# Patient Record
Sex: FEMALE | Race: WHITE | URBAN - METROPOLITAN AREA
[De-identification: names, ages, dates, MRNs, and addresses within clinical notes are randomized per-mention and may not be internally consistent; named-entity substitution may affect disease eponyms.]

---

## 2017-04-18 ENCOUNTER — OFFICE VISIT - HEALTHEAST (OUTPATIENT)
Dept: FAMILY MEDICINE | Facility: CLINIC | Age: 42
End: 2017-04-18

## 2017-04-18 DIAGNOSIS — R00.2 HEART PALPITATIONS: ICD-10-CM

## 2017-04-18 LAB
ATRIAL RATE - MUSE: 72 BPM
DIASTOLIC BLOOD PRESSURE - MUSE: NORMAL MMHG
INTERPRETATION ECG - MUSE: NORMAL
P AXIS - MUSE: 48 DEGREES
PR INTERVAL - MUSE: 152 MS
QRS DURATION - MUSE: 76 MS
QT - MUSE: 364 MS
QTC - MUSE: 398 MS
R AXIS - MUSE: 53 DEGREES
SYSTOLIC BLOOD PRESSURE - MUSE: NORMAL MMHG
T AXIS - MUSE: 38 DEGREES
VENTRICULAR RATE- MUSE: 72 BPM

## 2017-04-18 ASSESSMENT — MIFFLIN-ST. JEOR: SCORE: 1429.38

## 2017-04-20 ENCOUNTER — COMMUNICATION - HEALTHEAST (OUTPATIENT)
Dept: FAMILY MEDICINE | Facility: CLINIC | Age: 42
End: 2017-04-20

## 2017-04-20 DIAGNOSIS — D64.9 ANEMIA: ICD-10-CM

## 2017-06-15 ENCOUNTER — OFFICE VISIT - HEALTHEAST (OUTPATIENT)
Dept: FAMILY MEDICINE | Facility: CLINIC | Age: 42
End: 2017-06-15

## 2017-06-15 DIAGNOSIS — L30.9 DERMATITIS: ICD-10-CM

## 2017-06-15 ASSESSMENT — MIFFLIN-ST. JEOR: SCORE: 1436.47

## 2017-10-23 ENCOUNTER — AMBULATORY - HEALTHEAST (OUTPATIENT)
Dept: NURSING | Facility: CLINIC | Age: 42
End: 2017-10-23

## 2017-10-23 DIAGNOSIS — Z23 FLU VACCINE NEED: ICD-10-CM

## 2017-11-22 ENCOUNTER — HOSPITAL ENCOUNTER (OUTPATIENT)
Dept: MAMMOGRAPHY | Facility: HOSPITAL | Age: 42
Discharge: HOME | End: 2017-11-22
Attending: OBSTETRICS & GYNECOLOGY

## 2017-11-22 DIAGNOSIS — Z12.31 VISIT FOR SCREENING MAMMOGRAM: ICD-10-CM

## 2017-12-02 ENCOUNTER — HOSPITAL ENCOUNTER (EMERGENCY)
Facility: CLINIC | Age: 42
Discharge: HOME | End: 2017-12-02
Attending: NURSE PRACTITIONER | Admitting: NURSE PRACTITIONER
Payer: COMMERCIAL

## 2017-12-02 ENCOUNTER — RECORDS - HEALTHEAST (OUTPATIENT)
Dept: ADMINISTRATIVE | Facility: OTHER | Age: 42
End: 2017-12-02

## 2017-12-02 VITALS
BODY MASS INDEX: 24.25 KG/M2 | WEIGHT: 160 LBS | SYSTOLIC BLOOD PRESSURE: 115 MMHG | OXYGEN SATURATION: 100 % | DIASTOLIC BLOOD PRESSURE: 79 MMHG | TEMPERATURE: 98.3 F | RESPIRATION RATE: 16 BRPM | HEART RATE: 81 BPM | HEIGHT: 68 IN

## 2017-12-02 DIAGNOSIS — J02.9 ACUTE PHARYNGITIS, UNSPECIFIED ETIOLOGY: ICD-10-CM

## 2017-12-02 LAB
INTERNAL QC OK POCT: YES
S PYO AG THROAT QL IA.RAPID: NEGATIVE

## 2017-12-02 PROCEDURE — 99213 OFFICE O/P EST LOW 20 MIN: CPT

## 2017-12-02 PROCEDURE — 87880 STREP A ASSAY W/OPTIC: CPT | Performed by: NURSE PRACTITIONER

## 2017-12-02 PROCEDURE — 99213 OFFICE O/P EST LOW 20 MIN: CPT | Performed by: NURSE PRACTITIONER

## 2017-12-02 RX ORDER — CEPHALEXIN 500 MG/1
500 CAPSULE ORAL 2 TIMES DAILY
Qty: 20 CAPSULE | Refills: 0 | Status: SHIPPED | OUTPATIENT
Start: 2017-12-02 | End: 2017-12-12

## 2017-12-02 NOTE — ED AVS SNAPSHOT
[facility] Emergency Department    [address] [address]    Phone:  [phone]    Fax:  [phone]                                       Arcadio Barber   MRN: [id number]    Department:  [facility] Emergency Department   Date of Visit:  12/2/2017           Patient Information     Date Of Birth          1975        Your diagnoses for this visit were:     Acute pharyngitis, unspecified etiology- Presumed Strep Pharyngitis exposed to 2 of her children who have strep.       You were seen by Kitty Lugo APRN CNP.      Follow-up Information     Follow up with Clinic, [facility].    Why:  As needed    Contact information:    [address] [id number] [phone]        Discharge References/Attachments     PHARYNGITIS, STREP (PRESUMED) (ENGLISH)      24 Hour Appointment Hotline       To make an appointment at any [facility], call [phone] ([phone]). If you don't have a family doctor or clinic, we will help you find one. [facility] are conveniently located to serve the needs of you and your family.             Review of your medicines      START taking        Dose / Directions Last dose taken    cephALEXin 500 MG capsule   Commonly known as:  KEFLEX   Dose:  500 mg   Quantity:  20 capsule        Take 1 capsule (500 mg) by mouth 2 times daily for 10 days   Refills:  0                Prescriptions were sent or printed at these locations (1 Prescription)                   [facility] [address]   [address] [address]    Telephone:  [phone]   Fax:  [phone]   Hours:                  E-Prescribed (1 of 1)         cephALEXin (KEFLEX) 500 MG capsule                Procedures and tests performed during your visit     Rapid strep group A screen POCT      Orders Needing Specimen Collection     None      Pending Results     No orders found from 11/30/2017 to 12/3/2017.           "  Pending Culture Results     No orders found from 2017 to 12/3/2017.            Pending Results Instructions     If you had any lab results that were not finalized at the time of your Discharge, you can call the ED Lab Result RN at [phone]. You will be contacted by this team for any positive Lab results or changes in treatment. The nurses are available 7 days a week from 10A to 6:30P.  You can leave a message 24 hours per day and they will return your call.        Test Results From Your Hospital Stay        2017  8:04 PM      Component Results     Component Value Ref Range & Units Status    Rapid Strep A Screen Negative neg Final    Internal QC OK Yes  Final                Thank you for choosing [facility]       Thank you for choosing [facility] for your care. Our goal is always to provide you with excellent care. Hearing back from our patients is one way we can continue to improve our services. Please take a few minutes to complete the written survey that you may receive in the mail after you visit with us. Thank you!        [employer] Information     [employer] lets you send messages to your doctor, view your test results, renew your prescriptions, schedule appointments and more. To sign up, go to [email or web address] . Click on \"Log in\" on the left side of the screen, which will take you to the Welcome page. Then click on \"Sign up Now\" on the right side of the page.     You will be asked to enter the access code listed below, as well as some personal information. Please follow the directions to create your username and password.     Your access code is: [email or web address]  Expires: 3/2/2018  8:06 PM     Your access code will  in 90 days. If you need help or a new code, please call your [facility] clinic or [phone].        Care EveryWhere ID     This is your Care EveryWhere ID. This could be used by other organizations to access your [facility] medical records  [id number]        Equal Access to " Services     [facility]: Willard Marte, wadennisda luqadaha, qaybta kakary sharma. So [facility] [place] [phone].    ATENCIÓN: Si habla español, tiene a del cid disposición servicios gratuitos de asistencia lingüística. Llame al [phone].    We comply with applicable federal civil rights laws and [place] laws. We do not discriminate on the basis of race, color, national origin, age, disability, sex, sexual orientation, or gender identity.            After Visit Summary       This is your record. Keep this with you and show to your community pharmacist(s) and doctor(s) at your next visit.

## 2017-12-02 NOTE — ED AVS SNAPSHOT
[facility] Emergency Department    [address] [address]    Phone:  [phone]    Fax:  [phone]                                       Arcadio Barber   MRN: [id number]    Department:  [facility] Emergency Department   Date of Visit:  12/2/2017           After Visit Summary Signature Page     I have received my discharge instructions, and my questions have been answered. I have discussed any challenges I see with this plan with the nurse or doctor.    ..........................................................................................................................................  Patient/Patient Representative Signature      ..........................................................................................................................................  Patient Representative Print Name and Relationship to Patient    ..................................................               ................................................  Date                                            Time    ..........................................................................................................................................  Reviewed by Signature/Title    ...................................................              ..............................................  Date                                                            Time

## 2017-12-03 NOTE — ED PROVIDER NOTES
"  History     Chief Complaint   Patient presents with     Pharyngitis     HPI  Arcadio Barber is a 42 year old female who presents to urgent care for evaluation of sore throat.  Symptoms started today.  Denies fever.  Denies cough or upper respiratory symptoms.  Exposed to 2 of her children who are both positive for strep throat.    Problem List:    There are no active problems to display for this patient.       Past Medical History:    History reviewed. No pertinent past medical history.    Past Surgical History:    History reviewed. No pertinent surgical history.    Family History:    No family history on file.    Social History:  Marital Status:   [2]  Social History   Substance Use Topics     Smoking status: Never Smoker     Smokeless tobacco: Never Used     Alcohol use Not on file        Medications:      cephALEXin (KEFLEX) 500 MG capsule         Review of Systems  As mentioned above in the history present illness. All other systems were reviewed and are negative.    Physical Exam   BP: 115/79  Pulse: 81  Temp: 98.3  F (36.8  C)  Resp: 16  Height: 172.7 cm (5' 8\")  Weight: 72.6 kg (160 lb)  SpO2: 100 %      Physical Exam  GENERAL APPEARANCE: healthy, alert and no distress  EYES: EOMI,  PERRL, conjunctiva clear  HENT: ear canals and TM's normal.  Nose normal.  Posterior oropharynx erythema without exudate.  Uvula is midline.  No unilateral peritonsillar swelling.  NECK: supple, nontender, no lymphadenopathy  RESP: lungs clear to auscultation - no rales, rhonchi or wheezes  CV: regular rates and rhythm, normal S1 S2, no murmur noted    ED Course     ED Course     Procedures          Results for orders placed or performed during the hospital encounter of 12/02/17 (from the past 48 hour(s))   Rapid strep group A screen POCT   Result Value Ref Range    Rapid Strep A Screen Negative neg    Internal QC OK Yes        Assessments & Plan (with Medical Decision Making)   RST negative; however, I strongly " suspect a strep pharyngitis given her close contacts/exposure to 2 children who are both positive for strep.  No evidence of peritonsillar cellulitis or abscess.  I offered patient the option to wait and see what a throat culture showed or give her prescription now for presumed strep.  Patient opted for treatment.   Prescription for Keflex sent to the pharmacy.  Follow up with PCP if no improvement in 5-7 days.  Worrisome reasons to seek care sooner discussed.      I have reviewed the nursing notes.    I have reviewed the findings, diagnosis, plan and need for follow up with the patient.      New Prescriptions    CEPHALEXIN (KEFLEX) 500 MG CAPSULE    Take 1 capsule (500 mg) by mouth 2 times daily for 10 days       Final diagnoses:   Acute pharyngitis, unspecified etiology- Presumed Strep Pharyngitis - exposed to 2 of her children who have strep.       12/2/2017   [facility] EMERGENCY DEPARTMENT     Kitty Lugo APRN CNP  12/02/17 2009

## 2017-12-03 NOTE — ED NOTES
Patient here for possible strep, symptoms started 2 days ago.  Patient presents ambulatory to the urgent care.

## 2017-12-06 ENCOUNTER — RECORDS - HEALTHEAST (OUTPATIENT)
Dept: ADMINISTRATIVE | Facility: OTHER | Age: 42
End: 2017-12-06

## 2018-01-19 ENCOUNTER — RECORDS - HEALTHEAST (OUTPATIENT)
Dept: ADMINISTRATIVE | Facility: OTHER | Age: 43
End: 2018-01-19

## 2018-07-09 ENCOUNTER — COMMUNICATION - HEALTHEAST (OUTPATIENT)
Dept: TELEHEALTH | Facility: CLINIC | Age: 43
End: 2018-07-09

## 2018-07-09 ENCOUNTER — COMMUNICATION - HEALTHEAST (OUTPATIENT)
Dept: HEALTH INFORMATION MANAGEMENT | Facility: CLINIC | Age: 43
End: 2018-07-09

## 2018-07-26 ENCOUNTER — OFFICE VISIT - HEALTHEAST (OUTPATIENT)
Dept: FAMILY MEDICINE | Facility: CLINIC | Age: 43
End: 2018-07-26

## 2018-07-26 DIAGNOSIS — L65.9 HAIR LOSS: ICD-10-CM

## 2018-07-26 DIAGNOSIS — F41.9 ANXIETY: ICD-10-CM

## 2018-07-26 DIAGNOSIS — Z86.2 HISTORY OF ANEMIA: ICD-10-CM

## 2018-07-26 DIAGNOSIS — R05.9 COUGH: ICD-10-CM

## 2018-07-26 LAB
BASOPHILS # BLD AUTO: 0.1 THOU/UL (ref 0–0.2)
BASOPHILS NFR BLD AUTO: 1 % (ref 0–2)
EOSINOPHIL # BLD AUTO: 0.1 THOU/UL (ref 0–0.4)
EOSINOPHIL NFR BLD AUTO: 2 % (ref 0–6)
ERYTHROCYTE [DISTWIDTH] IN BLOOD BY AUTOMATED COUNT: 13.1 % (ref 11–14.5)
FERRITIN SERPL-MCNC: 12 NG/ML (ref 10–130)
HCT VFR BLD AUTO: 32.7 % (ref 35–47)
HGB BLD-MCNC: 10.8 G/DL (ref 12–16)
IRON SATN MFR SERPL: 5 % (ref 20–50)
IRON SERPL-MCNC: 21 UG/DL (ref 42–175)
LYMPHOCYTES # BLD AUTO: 1.9 THOU/UL (ref 0.8–4.4)
LYMPHOCYTES NFR BLD AUTO: 26 % (ref 20–40)
MCH RBC QN AUTO: 23.9 PG (ref 27–34)
MCHC RBC AUTO-ENTMCNC: 33 G/DL (ref 32–36)
MCV RBC AUTO: 73 FL (ref 80–100)
MONOCYTES # BLD AUTO: 0.6 THOU/UL (ref 0–0.9)
MONOCYTES NFR BLD AUTO: 8 % (ref 2–10)
NEUTROPHILS # BLD AUTO: 4.5 THOU/UL (ref 2–7.7)
NEUTROPHILS NFR BLD AUTO: 63 % (ref 50–70)
PLATELET # BLD AUTO: 347 THOU/UL (ref 140–440)
PMV BLD AUTO: 7.2 FL (ref 7–10)
RBC # BLD AUTO: 4.5 MILL/UL (ref 3.8–5.4)
TIBC SERPL-MCNC: 457 UG/DL (ref 313–563)
TRANSFERRIN SERPL-MCNC: 366 MG/DL (ref 212–360)
TSH SERPL DL<=0.005 MIU/L-ACNC: 3.4 UIU/ML (ref 0.3–5)
WBC: 7.1 THOU/UL (ref 4–11)

## 2018-07-26 ASSESSMENT — MIFFLIN-ST. JEOR: SCORE: 1419.46

## 2018-07-27 LAB
25(OH)D3 SERPL-MCNC: 22.1 NG/ML (ref 30–80)
25(OH)D3 SERPL-MCNC: 22.1 NG/ML (ref 30–80)

## 2018-08-09 ENCOUNTER — RECORDS - HEALTHEAST (OUTPATIENT)
Dept: ADMINISTRATIVE | Facility: OTHER | Age: 43
End: 2018-08-09

## 2018-08-27 ENCOUNTER — OFFICE VISIT - HEALTHEAST (OUTPATIENT)
Dept: FAMILY MEDICINE | Facility: CLINIC | Age: 43
End: 2018-08-27

## 2018-08-27 ENCOUNTER — RECORDS - HEALTHEAST (OUTPATIENT)
Dept: GENERAL RADIOLOGY | Facility: CLINIC | Age: 43
End: 2018-08-27

## 2018-08-27 DIAGNOSIS — R05.9 COUGH: ICD-10-CM

## 2018-08-27 ASSESSMENT — MIFFLIN-ST. JEOR: SCORE: 1426.55

## 2018-08-29 LAB
GAMMA INTERFERON BACKGROUND BLD IA-ACNC: 0.08 IU/ML
M TB IFN-G BLD-IMP: NEGATIVE
MITOGEN IGNF BCKGRD COR BLD-ACNC: 0.02 IU/ML
MITOGEN IGNF BCKGRD COR BLD-ACNC: 0.05 IU/ML
QTF INTERPRETATION: NORMAL
QTF MITOGEN - NIL: 9.81 IU/ML

## 2018-09-06 ENCOUNTER — OFFICE VISIT - HEALTHEAST (OUTPATIENT)
Dept: FAMILY MEDICINE | Facility: CLINIC | Age: 43
End: 2018-09-06

## 2018-09-06 DIAGNOSIS — L29.9 ITCHY SKIN: ICD-10-CM

## 2018-09-06 DIAGNOSIS — R05.9 COUGH: ICD-10-CM

## 2018-09-06 ASSESSMENT — MIFFLIN-ST. JEOR: SCORE: 1424

## 2018-09-10 ENCOUNTER — OFFICE VISIT - HEALTHEAST (OUTPATIENT)
Dept: FAMILY MEDICINE | Facility: CLINIC | Age: 43
End: 2018-09-10

## 2018-09-10 DIAGNOSIS — R00.2 PALPITATIONS: ICD-10-CM

## 2018-09-10 LAB
ALBUMIN SERPL-MCNC: 4 G/DL (ref 3.5–5)
ALP SERPL-CCNC: 56 U/L (ref 45–120)
ALT SERPL W P-5'-P-CCNC: 13 U/L (ref 0–45)
ANION GAP SERPL CALCULATED.3IONS-SCNC: 8 MMOL/L (ref 5–18)
AST SERPL W P-5'-P-CCNC: 12 U/L (ref 0–40)
BASOPHILS # BLD AUTO: 0.1 THOU/UL (ref 0–0.2)
BASOPHILS NFR BLD AUTO: 1 % (ref 0–2)
BILIRUB SERPL-MCNC: 0.5 MG/DL (ref 0–1)
BUN SERPL-MCNC: 9 MG/DL (ref 8–22)
CALCIUM SERPL-MCNC: 9.2 MG/DL (ref 8.5–10.5)
CHLORIDE BLD-SCNC: 107 MMOL/L (ref 98–107)
CO2 SERPL-SCNC: 24 MMOL/L (ref 22–31)
CREAT SERPL-MCNC: 0.65 MG/DL (ref 0.6–1.1)
EOSINOPHIL # BLD AUTO: 0.2 THOU/UL (ref 0–0.4)
EOSINOPHIL NFR BLD AUTO: 3 % (ref 0–6)
ERYTHROCYTE [DISTWIDTH] IN BLOOD BY AUTOMATED COUNT: 16 % (ref 11–14.5)
GFR SERPL CREATININE-BSD FRML MDRD: >60 ML/MIN/1.73M2
GLUCOSE BLD-MCNC: 86 MG/DL (ref 70–125)
HCT VFR BLD AUTO: 34.9 % (ref 35–47)
HGB BLD-MCNC: 11.7 G/DL (ref 12–16)
LYMPHOCYTES # BLD AUTO: 1.6 THOU/UL (ref 0.8–4.4)
LYMPHOCYTES NFR BLD AUTO: 23 % (ref 20–40)
MAGNESIUM SERPL-MCNC: 2.2 MG/DL (ref 1.8–2.6)
MCH RBC QN AUTO: 25.6 PG (ref 27–34)
MCHC RBC AUTO-ENTMCNC: 33.5 G/DL (ref 32–36)
MCV RBC AUTO: 76 FL (ref 80–100)
MONOCYTES # BLD AUTO: 0.4 THOU/UL (ref 0–0.9)
MONOCYTES NFR BLD AUTO: 6 % (ref 2–10)
NEUTROPHILS # BLD AUTO: 4.7 THOU/UL (ref 2–7.7)
NEUTROPHILS NFR BLD AUTO: 67 % (ref 50–70)
PLATELET # BLD AUTO: 288 THOU/UL (ref 140–440)
PMV BLD AUTO: 7.2 FL (ref 7–10)
POTASSIUM BLD-SCNC: 3.6 MMOL/L (ref 3.5–5)
PROT SERPL-MCNC: 6.3 G/DL (ref 6–8)
RBC # BLD AUTO: 4.57 MILL/UL (ref 3.8–5.4)
SODIUM SERPL-SCNC: 139 MMOL/L (ref 136–145)
TSH SERPL DL<=0.005 MIU/L-ACNC: 1.61 UIU/ML (ref 0.3–5)
WBC: 7 THOU/UL (ref 4–11)

## 2018-09-10 ASSESSMENT — MIFFLIN-ST. JEOR: SCORE: 1428.53

## 2018-09-11 LAB
ATRIAL RATE - MUSE: 83 BPM
DIASTOLIC BLOOD PRESSURE - MUSE: NORMAL MMHG
INTERPRETATION ECG - MUSE: NORMAL
P AXIS - MUSE: 53 DEGREES
PR INTERVAL - MUSE: 148 MS
QRS DURATION - MUSE: 74 MS
QT - MUSE: 362 MS
QTC - MUSE: 425 MS
R AXIS - MUSE: 55 DEGREES
SYSTOLIC BLOOD PRESSURE - MUSE: NORMAL MMHG
T AXIS - MUSE: 47 DEGREES
VENTRICULAR RATE- MUSE: 83 BPM

## 2018-09-13 ENCOUNTER — COMMUNICATION - HEALTHEAST (OUTPATIENT)
Dept: PULMONOLOGY | Facility: OTHER | Age: 43
End: 2018-09-13

## 2018-09-13 ENCOUNTER — AMBULATORY - HEALTHEAST (OUTPATIENT)
Dept: PULMONOLOGY | Facility: OTHER | Age: 43
End: 2018-09-13

## 2018-09-13 ENCOUNTER — COMMUNICATION - HEALTHEAST (OUTPATIENT)
Dept: FAMILY MEDICINE | Facility: CLINIC | Age: 43
End: 2018-09-13

## 2018-09-13 DIAGNOSIS — R05.9 COUGH: ICD-10-CM

## 2018-09-19 ENCOUNTER — HOSPITAL ENCOUNTER (OUTPATIENT)
Dept: RESPIRATORY THERAPY | Facility: HOSPITAL | Age: 43
Discharge: HOME | End: 2018-09-19

## 2018-09-19 DIAGNOSIS — R05.9 COUGH: ICD-10-CM

## 2018-09-26 ENCOUNTER — RECORDS - HEALTHEAST (OUTPATIENT)
Dept: ADMINISTRATIVE | Facility: OTHER | Age: 43
End: 2018-09-26

## 2018-10-01 ENCOUNTER — COMMUNICATION - HEALTHEAST (OUTPATIENT)
Dept: SCHEDULING | Facility: CLINIC | Age: 43
End: 2018-10-01

## 2018-10-02 ENCOUNTER — OFFICE VISIT - HEALTHEAST (OUTPATIENT)
Dept: PULMONOLOGY | Facility: OTHER | Age: 43
End: 2018-10-02

## 2018-10-02 ENCOUNTER — RECORDS - HEALTHEAST (OUTPATIENT)
Dept: ADMINISTRATIVE | Facility: OTHER | Age: 43
End: 2018-10-02

## 2018-10-02 DIAGNOSIS — K44.9 HIATAL HERNIA WITH GERD: ICD-10-CM

## 2018-10-02 DIAGNOSIS — R05.9 COUGH: ICD-10-CM

## 2018-10-02 DIAGNOSIS — J32.9 SINUSITIS, UNSPECIFIED CHRONICITY, UNSPECIFIED LOCATION: ICD-10-CM

## 2018-10-02 DIAGNOSIS — K21.9 HIATAL HERNIA WITH GERD: ICD-10-CM

## 2018-10-02 ASSESSMENT — MIFFLIN-ST. JEOR: SCORE: 1424

## 2018-10-11 ENCOUNTER — HOSPITAL ENCOUNTER (OUTPATIENT)
Dept: CARDIOLOGY | Facility: HOSPITAL | Age: 43
Discharge: HOME | End: 2018-10-11

## 2018-10-11 DIAGNOSIS — R00.2 PALPITATIONS: ICD-10-CM

## 2018-10-11 LAB
AORTIC ROOT: 2.8 CM
AORTIC VALVE MEAN VELOCITY: 124 CM/S
AV DIMENSIONLESS INDEX VTI: 0.6
AV MEAN GRADIENT: 6 MMHG
AV PEAK GRADIENT: 8.9 MMHG
AV VALVE AREA: 1.8 CM2
AV VELOCITY RATIO: 0.7
BSA FOR ECHO PROCEDURE: 1.89 M2
CV BLOOD PRESSURE: NORMAL MMHG
CV ECHO HEIGHT: 67.5 IN
CV ECHO WEIGHT: 165 LBS
CV STRESS CURRENT BP HE: NORMAL
CV STRESS CURRENT HR HE: 100
CV STRESS CURRENT HR HE: 104
CV STRESS CURRENT HR HE: 107
CV STRESS CURRENT HR HE: 107
CV STRESS CURRENT HR HE: 108
CV STRESS CURRENT HR HE: 109
CV STRESS CURRENT HR HE: 110
CV STRESS CURRENT HR HE: 110
CV STRESS CURRENT HR HE: 112
CV STRESS CURRENT HR HE: 115
CV STRESS CURRENT HR HE: 120
CV STRESS CURRENT HR HE: 122
CV STRESS CURRENT HR HE: 122
CV STRESS CURRENT HR HE: 126
CV STRESS CURRENT HR HE: 129
CV STRESS CURRENT HR HE: 132
CV STRESS CURRENT HR HE: 135
CV STRESS CURRENT HR HE: 136
CV STRESS CURRENT HR HE: 137
CV STRESS CURRENT HR HE: 148
CV STRESS CURRENT HR HE: 149
CV STRESS CURRENT HR HE: 150
CV STRESS CURRENT HR HE: 150
CV STRESS CURRENT HR HE: 157
CV STRESS CURRENT HR HE: 158
CV STRESS CURRENT HR HE: 160
CV STRESS CURRENT HR HE: 163
CV STRESS CURRENT HR HE: 164
CV STRESS CURRENT HR HE: 171
CV STRESS CURRENT HR HE: 171
CV STRESS CURRENT HR HE: 173
CV STRESS CURRENT HR HE: 174
CV STRESS CURRENT HR HE: 91
CV STRESS DEVIATION TIME HE: NORMAL
CV STRESS ECHO PERCENT HR HE: NORMAL
CV STRESS EXERCISE STAGE HE: NORMAL
CV STRESS EXERCISE STAGE REACHED HE: NORMAL
CV STRESS FINAL RESTING BP HE: NORMAL
CV STRESS FINAL RESTING HR HE: 107
CV STRESS MAX HR HE: 174
CV STRESS MAX TREADMILL GRADE HE: 14
CV STRESS MAX TREADMILL SPEED HE: 3.4
CV STRESS PEAK DIA BP HE: NORMAL
CV STRESS PEAK SYS BP HE: NORMAL
CV STRESS PHASE HE: NORMAL
CV STRESS PROTOCOL HE: NORMAL
CV STRESS RESTING PT POSITION HE: NORMAL
CV STRESS RESTING PT POSITION HE: NORMAL
CV STRESS ST DEVIATION AMOUNT HE: NORMAL
CV STRESS ST DEVIATION ELEVATION HE: NORMAL
CV STRESS ST EVELATION AMOUNT HE: NORMAL
CV STRESS TEST TYPE HE: NORMAL
CV STRESS TOTAL STAGE TIME MIN 1 HE: NORMAL
DOP CALC AO PEAK VEL: 149 CM/S
DOP CALC AO VTI: 29.9 CM
DOP CALC LVOT AREA: 2.83 CM2
DOP CALC LVOT DIAMETER: 1.9 CM
DOP CALC LVOT PEAK VEL: 104 CM/S
DOP CALC LVOT STROKE VOLUME: 55 CM3
DOP CALCLVOT PEAK VEL VTI: 19.4 CM
EJECTION FRACTION: 67 % (ref 55–75)
FRACTIONAL SHORTENING: 33.8 % (ref 28–44)
INTERVENTRICULAR SEPTUM IN END DIASTOLE: 0.84 CM (ref 0.6–0.9)
IVS/PW RATIO: 0.9
LA AREA 1: 12.9 CM2
LA AREA 2: 14.5 CM2
LEFT ATRIUM LENGTH: 4.3 CM
LEFT ATRIUM SIZE: 2.6 CM
LEFT ATRIUM VOLUME INDEX: 19.6 ML/M2
LEFT ATRIUM VOLUME: 37 ML
LEFT VENTRICLE CARDIAC INDEX: 2 L/MIN/M2
LEFT VENTRICLE CARDIAC OUTPUT: 3.8 L/MIN
LEFT VENTRICLE DIASTOLIC VOLUME INDEX: 41.6 CM3/M2 (ref 34–74)
LEFT VENTRICLE DIASTOLIC VOLUME: 78.6 CM3 (ref 46–106)
LEFT VENTRICLE HEART RATE: 70 BPM
LEFT VENTRICLE MASS INDEX: 58.5 G/M2
LEFT VENTRICLE SYSTOLIC VOLUME INDEX: 13.9 CM3/M2 (ref 11–31)
LEFT VENTRICLE SYSTOLIC VOLUME: 26.2 CM3 (ref 14–42)
LEFT VENTRICULAR INTERNAL DIMENSION IN DIASTOLE: 4.05 CM (ref 3.8–5.2)
LEFT VENTRICULAR INTERNAL DIMENSION IN SYSTOLE: 2.68 CM (ref 2.2–3.5)
LEFT VENTRICULAR MASS: 110.5 G
LEFT VENTRICULAR OUTFLOW TRACT MEAN GRADIENT: 2 MMHG
LEFT VENTRICULAR OUTFLOW TRACT MEAN VELOCITY: 73.9 CM/S
LEFT VENTRICULAR OUTFLOW TRACT PEAK GRADIENT: 4 MMHG
LEFT VENTRICULAR POSTERIOR WALL IN END DIASTOLE: 0.94 CM (ref 0.6–0.9)
LV STROKE VOLUME INDEX: 29.1 ML/M2
MITRAL VALVE E/A RATIO: 1.2
MV AVERAGE E/E' RATIO: 7.6 CM/S
MV DECELERATION TIME: 137 MS
MV E'TISSUE VEL-LAT: 9.86 CM/S
MV E'TISSUE VEL-MED: 11.7 CM/S
MV LATERAL E/E' RATIO: 8.3
MV MEDIAL E/E' RATIO: 7
MV PEAK A VELOCITY: 69.8 CM/S
MV PEAK E VELOCITY: 82 CM/S
NUC REST DIASTOLIC VOLUME INDEX: 2640 LBS
NUC REST SYSTOLIC VOLUME INDEX: 67.5 IN
STRESS ECHO BASELINE BP: NORMAL
STRESS ECHO BASELINE HR: 91
STRESS ECHO CALCULATED PERCENT HR: 99 %
STRESS ECHO LAST STRESS BP: NORMAL
STRESS ECHO LAST STRESS HR: 174
STRESS ECHO POST ESTIMATED WORKLOAD: 9.7
STRESS ECHO POST EXERCISE DUR MIN: 8
STRESS ECHO POST EXERCISE DUR SEC: 0
STRESS ECHO TARGET HR: 173.25
TRICUSPID REGURGITATION PEAK PRESSURE GRADIENT: 23.2 MMHG
TRICUSPID VALVE ANULAR PLANE SYSTOLIC EXCURSION: 2.9 CM
TRICUSPID VALVE PEAK REGURGITANT VELOCITY: 241 CM/S

## 2018-10-11 ASSESSMENT — MIFFLIN-ST. JEOR: SCORE: 1424

## 2018-10-17 ENCOUNTER — AMBULATORY - HEALTHEAST (OUTPATIENT)
Dept: FAMILY MEDICINE | Facility: CLINIC | Age: 43
End: 2018-10-17

## 2018-10-17 DIAGNOSIS — I49.3 FREQUENT PVCS: ICD-10-CM

## 2018-10-22 ENCOUNTER — OFFICE VISIT - HEALTHEAST (OUTPATIENT)
Dept: CARDIOLOGY | Facility: CLINIC | Age: 43
End: 2018-10-22

## 2018-10-22 DIAGNOSIS — I49.3 PVC (PREMATURE VENTRICULAR CONTRACTION): ICD-10-CM

## 2018-10-22 ASSESSMENT — MIFFLIN-ST. JEOR: SCORE: 1433.07

## 2018-10-30 ENCOUNTER — OFFICE VISIT - HEALTHEAST (OUTPATIENT)
Dept: FAMILY MEDICINE | Facility: CLINIC | Age: 43
End: 2018-10-30

## 2018-10-30 DIAGNOSIS — K21.9 GERD (GASTROESOPHAGEAL REFLUX DISEASE): ICD-10-CM

## 2018-10-30 DIAGNOSIS — R20.2 PARESTHESIAS: ICD-10-CM

## 2018-10-30 LAB
FSH SERPL-ACNC: 5.5 MIU/ML
VIT B12 SERPL-MCNC: 533 PG/ML (ref 213–816)

## 2018-10-30 ASSESSMENT — MIFFLIN-ST. JEOR: SCORE: 1419.46

## 2018-10-31 LAB — B BURGDOR IGG+IGM SER QL: 0.05 INDEX VALUE

## 2018-11-01 ENCOUNTER — COMMUNICATION - HEALTHEAST (OUTPATIENT)
Dept: FAMILY MEDICINE | Facility: CLINIC | Age: 43
End: 2018-11-01

## 2018-11-01 LAB — ANA SER QL: 2.1 U

## 2018-11-16 ENCOUNTER — HOSPITAL ENCOUNTER (OUTPATIENT)
Dept: MRI IMAGING | Facility: HOSPITAL | Age: 43
Discharge: HOME | End: 2018-11-16
Attending: FAMILY MEDICINE

## 2018-11-16 DIAGNOSIS — R20.2 PARESTHESIAS: ICD-10-CM

## 2018-11-19 ENCOUNTER — COMMUNICATION - HEALTHEAST (OUTPATIENT)
Dept: FAMILY MEDICINE | Facility: CLINIC | Age: 43
End: 2018-11-19

## 2018-11-19 DIAGNOSIS — N83.209 CYST OF OVARY, UNSPECIFIED LATERALITY: ICD-10-CM

## 2018-11-27 ENCOUNTER — HOSPITAL ENCOUNTER (OUTPATIENT)
Dept: MAMMOGRAPHY | Facility: CLINIC | Age: 43
Discharge: HOME | End: 2018-11-27
Attending: OBSTETRICS & GYNECOLOGY

## 2018-11-27 DIAGNOSIS — Z12.31 VISIT FOR SCREENING MAMMOGRAM: ICD-10-CM

## 2018-11-30 ENCOUNTER — OFFICE VISIT - HEALTHEAST (OUTPATIENT)
Dept: FAMILY MEDICINE | Facility: CLINIC | Age: 43
End: 2018-11-30

## 2018-11-30 DIAGNOSIS — N92.0 EXCESSIVE OR FREQUENT MENSTRUATION: ICD-10-CM

## 2018-11-30 DIAGNOSIS — N83.201 CYST OF RIGHT OVARY: ICD-10-CM

## 2018-11-30 DIAGNOSIS — G56.01 CARPAL TUNNEL SYNDROME OF RIGHT WRIST: ICD-10-CM

## 2018-11-30 DIAGNOSIS — R07.81 RIB PAIN: ICD-10-CM

## 2018-11-30 DIAGNOSIS — K21.9 GASTROESOPHAGEAL REFLUX DISEASE WITHOUT ESOPHAGITIS: ICD-10-CM

## 2018-11-30 DIAGNOSIS — M67.431 GANGLION CYST OF WRIST, RIGHT: ICD-10-CM

## 2018-11-30 DIAGNOSIS — M79.651 PAIN OF RIGHT THIGH: ICD-10-CM

## 2018-11-30 DIAGNOSIS — Z01.818 PREOP GENERAL PHYSICAL EXAM: ICD-10-CM

## 2018-11-30 LAB
ERYTHROCYTE [DISTWIDTH] IN BLOOD BY AUTOMATED COUNT: 13.4 % (ref 11–14.5)
HCG UR QL: NEGATIVE
HCT VFR BLD AUTO: 38.1 % (ref 35–47)
HGB BLD-MCNC: 12.9 G/DL (ref 12–16)
MCH RBC QN AUTO: 27.3 PG (ref 27–34)
MCHC RBC AUTO-ENTMCNC: 33.8 G/DL (ref 32–36)
MCV RBC AUTO: 81 FL (ref 80–100)
PLATELET # BLD AUTO: 299 THOU/UL (ref 140–440)
PMV BLD AUTO: 6.9 FL (ref 7–10)
RBC # BLD AUTO: 4.73 MILL/UL (ref 3.8–5.4)
SP GR UR STRIP: 1.02 (ref 1–1.03)
WBC: 6.4 THOU/UL (ref 4–11)

## 2018-11-30 ASSESSMENT — MIFFLIN-ST. JEOR: SCORE: 1410.39

## 2018-12-10 ENCOUNTER — SURGERY - HEALTHEAST (OUTPATIENT)
Dept: SURGERY | Facility: HOSPITAL | Age: 43
End: 2018-12-10

## 2018-12-10 ENCOUNTER — ANESTHESIA - HEALTHEAST (OUTPATIENT)
Dept: SURGERY | Facility: HOSPITAL | Age: 43
End: 2018-12-10

## 2018-12-10 ASSESSMENT — MIFFLIN-ST. JEOR: SCORE: 1409.26

## 2019-01-08 ENCOUNTER — COMMUNICATION - HEALTHEAST (OUTPATIENT)
Dept: FAMILY MEDICINE | Facility: CLINIC | Age: 44
End: 2019-01-08

## 2019-01-08 DIAGNOSIS — K21.9 GERD (GASTROESOPHAGEAL REFLUX DISEASE): ICD-10-CM

## 2019-01-10 ENCOUNTER — RECORDS - HEALTHEAST (OUTPATIENT)
Dept: ADMINISTRATIVE | Facility: OTHER | Age: 44
End: 2019-01-10

## 2019-02-28 ENCOUNTER — AMBULATORY - HEALTHEAST (OUTPATIENT)
Dept: FAMILY MEDICINE | Facility: CLINIC | Age: 44
End: 2019-02-28

## 2019-02-28 ENCOUNTER — RECORDS - HEALTHEAST (OUTPATIENT)
Dept: ADMINISTRATIVE | Facility: OTHER | Age: 44
End: 2019-02-28

## 2019-02-28 ENCOUNTER — OFFICE VISIT - HEALTHEAST (OUTPATIENT)
Dept: FAMILY MEDICINE | Facility: CLINIC | Age: 44
End: 2019-02-28

## 2019-02-28 DIAGNOSIS — D50.9 IRON DEFICIENCY ANEMIA, UNSPECIFIED IRON DEFICIENCY ANEMIA TYPE: ICD-10-CM

## 2019-02-28 DIAGNOSIS — R07.81 RIB PAIN ON RIGHT SIDE: ICD-10-CM

## 2019-02-28 DIAGNOSIS — C44.99 DERMATOFIBROSARCOMA: ICD-10-CM

## 2019-02-28 DIAGNOSIS — R05.9 COUGH: ICD-10-CM

## 2019-02-28 LAB
ERYTHROCYTE [DISTWIDTH] IN BLOOD BY AUTOMATED COUNT: 13 % (ref 11–14.5)
HCT VFR BLD AUTO: 38.6 % (ref 35–47)
HGB BLD-MCNC: 12.9 G/DL (ref 12–16)
MCH RBC QN AUTO: 26.9 PG (ref 27–34)
MCHC RBC AUTO-ENTMCNC: 33.5 G/DL (ref 32–36)
MCV RBC AUTO: 80 FL (ref 80–100)
PLATELET # BLD AUTO: 280 THOU/UL (ref 140–440)
PMV BLD AUTO: 7 FL (ref 7–10)
RBC # BLD AUTO: 4.81 MILL/UL (ref 3.8–5.4)
WBC: 6.2 THOU/UL (ref 4–11)

## 2019-02-28 ASSESSMENT — MIFFLIN-ST. JEOR: SCORE: 1424.56

## 2019-03-04 ENCOUNTER — COMMUNICATION - HEALTHEAST (OUTPATIENT)
Dept: FAMILY MEDICINE | Facility: CLINIC | Age: 44
End: 2019-03-04

## 2019-04-03 ENCOUNTER — RECORDS - HEALTHEAST (OUTPATIENT)
Dept: ADMINISTRATIVE | Facility: OTHER | Age: 44
End: 2019-04-03

## 2019-04-09 ENCOUNTER — RECORDS - HEALTHEAST (OUTPATIENT)
Dept: FAMILY MEDICINE | Facility: CLINIC | Age: 44
End: 2019-04-09

## 2019-04-09 ENCOUNTER — COMMUNICATION - HEALTHEAST (OUTPATIENT)
Dept: FAMILY MEDICINE | Facility: CLINIC | Age: 44
End: 2019-04-09

## 2019-04-09 DIAGNOSIS — Z86.39 HISTORY OF VITAMIN D DEFICIENCY: ICD-10-CM

## 2019-04-10 ENCOUNTER — COMMUNICATION - HEALTHEAST (OUTPATIENT)
Dept: LAB | Facility: CLINIC | Age: 44
End: 2019-04-10

## 2019-04-10 ENCOUNTER — AMBULATORY - HEALTHEAST (OUTPATIENT)
Dept: LAB | Facility: CLINIC | Age: 44
End: 2019-04-10

## 2019-04-10 DIAGNOSIS — Z86.39 HISTORY OF VITAMIN D DEFICIENCY: ICD-10-CM

## 2019-04-11 ENCOUNTER — COMMUNICATION - HEALTHEAST (OUTPATIENT)
Dept: FAMILY MEDICINE | Facility: CLINIC | Age: 44
End: 2019-04-11

## 2019-04-11 LAB
25(OH)D3 SERPL-MCNC: 24.2 NG/ML (ref 30–80)
25(OH)D3 SERPL-MCNC: 24.2 NG/ML (ref 30–80)

## 2019-05-02 ENCOUNTER — OFFICE VISIT - HEALTHEAST (OUTPATIENT)
Dept: FAMILY MEDICINE | Facility: CLINIC | Age: 44
End: 2019-05-02

## 2019-05-02 DIAGNOSIS — H00.011 HORDEOLUM EXTERNUM OF RIGHT UPPER EYELID: ICD-10-CM

## 2019-05-02 DIAGNOSIS — H02.723: ICD-10-CM

## 2019-05-02 ASSESSMENT — MIFFLIN-ST. JEOR: SCORE: 1450.36

## 2019-05-06 ENCOUNTER — COMMUNICATION - HEALTHEAST (OUTPATIENT)
Dept: FAMILY MEDICINE | Facility: CLINIC | Age: 44
End: 2019-05-06

## 2019-06-06 ENCOUNTER — COMMUNICATION - HEALTHEAST (OUTPATIENT)
Dept: TELEHEALTH | Facility: CLINIC | Age: 44
End: 2019-06-06

## 2019-06-18 ENCOUNTER — OFFICE VISIT - HEALTHEAST (OUTPATIENT)
Dept: FAMILY MEDICINE | Facility: CLINIC | Age: 44
End: 2019-06-18

## 2019-06-18 DIAGNOSIS — R10.84 ABDOMINAL PAIN, GENERALIZED: ICD-10-CM

## 2019-06-18 LAB
ALBUMIN SERPL-MCNC: 4 G/DL (ref 3.5–5)
ALP SERPL-CCNC: 58 U/L (ref 45–120)
ALT SERPL W P-5'-P-CCNC: 11 U/L (ref 0–45)
ANION GAP SERPL CALCULATED.3IONS-SCNC: 6 MMOL/L (ref 5–18)
AST SERPL W P-5'-P-CCNC: 12 U/L (ref 0–40)
BILIRUB SERPL-MCNC: 0.9 MG/DL (ref 0–1)
BUN SERPL-MCNC: 10 MG/DL (ref 8–22)
CALCIUM SERPL-MCNC: 9.5 MG/DL (ref 8.5–10.5)
CHLORIDE BLD-SCNC: 107 MMOL/L (ref 98–107)
CO2 SERPL-SCNC: 27 MMOL/L (ref 22–31)
CREAT SERPL-MCNC: 0.7 MG/DL (ref 0.6–1.1)
GFR SERPL CREATININE-BSD FRML MDRD: >60 ML/MIN/1.73M2
GLUCOSE BLD-MCNC: 90 MG/DL (ref 70–125)
LIPASE SERPL-CCNC: 16 U/L (ref 0–52)
POTASSIUM BLD-SCNC: 4 MMOL/L (ref 3.5–5)
PROT SERPL-MCNC: 6.5 G/DL (ref 6–8)
SODIUM SERPL-SCNC: 140 MMOL/L (ref 136–145)

## 2019-06-18 ASSESSMENT — MIFFLIN-ST. JEOR: SCORE: 1441.86

## 2019-06-19 ENCOUNTER — COMMUNICATION - HEALTHEAST (OUTPATIENT)
Dept: FAMILY MEDICINE | Facility: CLINIC | Age: 44
End: 2019-06-19

## 2019-06-19 DIAGNOSIS — R10.84 ABDOMINAL PAIN, GENERALIZED: ICD-10-CM

## 2019-06-24 ENCOUNTER — HOSPITAL ENCOUNTER (OUTPATIENT)
Dept: ULTRASOUND IMAGING | Facility: HOSPITAL | Age: 44
Discharge: HOME | End: 2019-06-24
Attending: FAMILY MEDICINE

## 2019-06-24 DIAGNOSIS — R10.84 ABDOMINAL PAIN, GENERALIZED: ICD-10-CM

## 2019-07-08 ENCOUNTER — COMMUNICATION - HEALTHEAST (OUTPATIENT)
Dept: TELEHEALTH | Facility: CLINIC | Age: 44
End: 2019-07-08

## 2019-07-12 ENCOUNTER — RECORDS - HEALTHEAST (OUTPATIENT)
Dept: ADMINISTRATIVE | Facility: OTHER | Age: 44
End: 2019-07-12

## 2019-09-20 ENCOUNTER — COMMUNICATION - HEALTHEAST (OUTPATIENT)
Dept: FAMILY MEDICINE | Facility: CLINIC | Age: 44
End: 2019-09-20

## 2019-09-20 ENCOUNTER — OFFICE VISIT - HEALTHEAST (OUTPATIENT)
Dept: FAMILY MEDICINE | Facility: CLINIC | Age: 44
End: 2019-09-20

## 2019-09-20 DIAGNOSIS — J34.2 DEVIATED NASAL SEPTUM: ICD-10-CM

## 2019-09-20 DIAGNOSIS — Z01.818 ENCOUNTER FOR PREOPERATIVE EXAMINATION FOR GENERAL SURGICAL PROCEDURE: ICD-10-CM

## 2019-09-20 LAB
ERYTHROCYTE [DISTWIDTH] IN BLOOD BY AUTOMATED COUNT: 11.5 % (ref 11–14.5)
HCT VFR BLD AUTO: 40.5 % (ref 35–47)
HGB BLD-MCNC: 13.7 G/DL (ref 12–16)
MCH RBC QN AUTO: 28.9 PG (ref 27–34)
MCHC RBC AUTO-ENTMCNC: 33.7 G/DL (ref 32–36)
MCV RBC AUTO: 86 FL (ref 80–100)
PLATELET # BLD AUTO: 260 THOU/UL (ref 140–440)
PMV BLD AUTO: 6.9 FL (ref 7–10)
RBC # BLD AUTO: 4.73 MILL/UL (ref 3.8–5.4)
WBC: 7.2 THOU/UL (ref 4–11)

## 2019-09-20 ASSESSMENT — MIFFLIN-ST. JEOR: SCORE: 1431.65

## 2019-11-04 ENCOUNTER — HEALTH MAINTENANCE LETTER (OUTPATIENT)
Age: 44
End: 2019-11-04

## 2019-11-07 ENCOUNTER — AMBULATORY - HEALTHEAST (OUTPATIENT)
Dept: NURSING | Facility: CLINIC | Age: 44
End: 2019-11-07

## 2019-11-15 ENCOUNTER — OFFICE VISIT - HEALTHEAST (OUTPATIENT)
Dept: FAMILY MEDICINE | Facility: CLINIC | Age: 44
End: 2019-11-15

## 2019-11-15 DIAGNOSIS — G89.29 CHRONIC THORACIC BACK PAIN, UNSPECIFIED BACK PAIN LATERALITY: ICD-10-CM

## 2019-11-15 DIAGNOSIS — Z00.00 ANNUAL PHYSICAL EXAM: ICD-10-CM

## 2019-11-15 DIAGNOSIS — M54.6 CHRONIC THORACIC BACK PAIN, UNSPECIFIED BACK PAIN LATERALITY: ICD-10-CM

## 2019-11-15 DIAGNOSIS — R45.86 MOOD CHANGE: ICD-10-CM

## 2019-11-15 DIAGNOSIS — K44.9 HIATAL HERNIA: ICD-10-CM

## 2019-11-15 DIAGNOSIS — Z13.220 LIPID SCREENING: ICD-10-CM

## 2019-11-15 DIAGNOSIS — K21.9 GERD (GASTROESOPHAGEAL REFLUX DISEASE): ICD-10-CM

## 2019-11-15 ASSESSMENT — MIFFLIN-ST. JEOR: SCORE: 1443.85

## 2019-11-20 ENCOUNTER — COMMUNICATION - HEALTHEAST (OUTPATIENT)
Dept: FAMILY MEDICINE | Facility: CLINIC | Age: 44
End: 2019-11-20

## 2019-11-20 DIAGNOSIS — J30.9 ALLERGIC RHINITIS, UNSPECIFIED SEASONALITY, UNSPECIFIED TRIGGER: ICD-10-CM

## 2019-11-21 ENCOUNTER — OFFICE VISIT - HEALTHEAST (OUTPATIENT)
Dept: ALLERGY | Facility: CLINIC | Age: 44
End: 2019-11-21

## 2019-11-21 DIAGNOSIS — J30.89 ALLERGIC RHINITIS DUE TO FUNGAL SPORES, UNSPECIFIED SEASONALITY: ICD-10-CM

## 2019-11-21 ASSESSMENT — MIFFLIN-ST. JEOR: SCORE: 1443.28

## 2019-11-25 NOTE — TELEPHONE ENCOUNTER
RECORDS RECEIVED FROM: Self   DATE RECEIVED: 1/17/20   NOTES (FOR ALL VISITS) STATUS DETAILS   OFFICE NOTE from referring provider N/A    OFFICE NOTE from other specialist N/A    DISCHARGE SUMMARY from hospital N/A    DISCHARGE REPORT from the ER Care Everywhere [facility]:  10/25/19   OPERATIVE REPORT N/A    MEDICATION LIST Care Everywhere    IMAGING  (FOR ALL VISITS)     EMG N/A    EEG N/A    ECT N/A    MRI (HEAD, NECK, SPINE) In process [facility]:  MRI Lumbar Spine 11/16/18   LUMBAR PUNCTURE N/A    MARÍA Scan N/A    CT (HEAD, NECK, SPINE) N/A       Action 11/25/19   Action Taken [facility]

## 2019-11-26 ENCOUNTER — AMBULATORY - HEALTHEAST (OUTPATIENT)
Dept: FAMILY MEDICINE | Facility: CLINIC | Age: 44
End: 2019-11-26

## 2019-11-26 DIAGNOSIS — Z71.1 CONCERN ABOUT NEUROLOGICAL DISEASE WITHOUT DIAGNOSIS: ICD-10-CM

## 2019-11-27 ENCOUNTER — COMMUNICATION - HEALTHEAST (OUTPATIENT)
Dept: FAMILY MEDICINE | Facility: CLINIC | Age: 44
End: 2019-11-27

## 2019-11-29 ENCOUNTER — TRANSCRIBE ORDERS (OUTPATIENT)
Dept: OTHER | Age: 44
End: 2019-11-29

## 2019-11-29 ENCOUNTER — HOSPITAL ENCOUNTER (OUTPATIENT)
Dept: MAMMOGRAPHY | Facility: CLINIC | Age: 44
Discharge: HOME | End: 2019-11-29
Attending: FAMILY MEDICINE

## 2019-11-29 DIAGNOSIS — Z71.1 CONCERN ABOUT NEUROLOGICAL DISEASE WITHOUT DIAGNOSIS: Primary | ICD-10-CM

## 2019-11-29 DIAGNOSIS — Z12.31 VISIT FOR SCREENING MAMMOGRAM: ICD-10-CM

## 2019-12-18 ENCOUNTER — RECORDS - HEALTHEAST (OUTPATIENT)
Dept: ADMINISTRATIVE | Facility: OTHER | Age: 44
End: 2019-12-18

## 2019-12-26 ENCOUNTER — COMMUNICATION - HEALTHEAST (OUTPATIENT)
Dept: FAMILY MEDICINE | Facility: CLINIC | Age: 44
End: 2019-12-26

## 2019-12-26 DIAGNOSIS — R20.2 PARESTHESIAS: ICD-10-CM

## 2020-01-02 ENCOUNTER — RECORDS - HEALTHEAST (OUTPATIENT)
Dept: ADMINISTRATIVE | Facility: OTHER | Age: 45
End: 2020-01-02

## 2020-01-09 ENCOUNTER — OFFICE VISIT - HEALTHEAST (OUTPATIENT)
Dept: FAMILY MEDICINE | Facility: CLINIC | Age: 45
End: 2020-01-09

## 2020-01-09 DIAGNOSIS — R68.2 DRY MOUTH: ICD-10-CM

## 2020-01-09 DIAGNOSIS — K13.70 ORAL LESION: ICD-10-CM

## 2020-01-09 DIAGNOSIS — R93.7 ABNORMAL MRI, SPINE: ICD-10-CM

## 2020-01-09 DIAGNOSIS — Z13.220 LIPID SCREENING: ICD-10-CM

## 2020-01-09 LAB
ALBUMIN SERPL-MCNC: 4.4 G/DL (ref 3.5–5)
ALP SERPL-CCNC: 70 U/L (ref 45–120)
ALT SERPL W P-5'-P-CCNC: 15 U/L (ref 0–45)
ANION GAP SERPL CALCULATED.3IONS-SCNC: 12 MMOL/L (ref 5–18)
AST SERPL W P-5'-P-CCNC: 13 U/L (ref 0–40)
BILIRUB SERPL-MCNC: 0.7 MG/DL (ref 0–1)
BUN SERPL-MCNC: 11 MG/DL (ref 8–22)
C REACTIVE PROTEIN LHE: 0.5 MG/DL (ref 0–0.8)
CALCIUM SERPL-MCNC: 9.8 MG/DL (ref 8.5–10.5)
CHLORIDE BLD-SCNC: 108 MMOL/L (ref 98–107)
CO2 SERPL-SCNC: 21 MMOL/L (ref 22–31)
CREAT SERPL-MCNC: 0.69 MG/DL (ref 0.6–1.1)
ERYTHROCYTE [DISTWIDTH] IN BLOOD BY AUTOMATED COUNT: 10.8 % (ref 11–14.5)
ERYTHROCYTE [SEDIMENTATION RATE] IN BLOOD BY WESTERGREN METHOD: 3 MM/HR (ref 0–20)
GFR SERPL CREATININE-BSD FRML MDRD: >60 ML/MIN/1.73M2
GLUCOSE BLD-MCNC: 84 MG/DL (ref 70–125)
HCT VFR BLD AUTO: 39.4 % (ref 35–47)
HGB BLD-MCNC: 13.6 G/DL (ref 12–16)
MCH RBC QN AUTO: 29.4 PG (ref 27–34)
MCHC RBC AUTO-ENTMCNC: 34.5 G/DL (ref 32–36)
MCV RBC AUTO: 85 FL (ref 80–100)
PLATELET # BLD AUTO: 266 THOU/UL (ref 140–440)
PMV BLD AUTO: 6.8 FL (ref 7–10)
POTASSIUM BLD-SCNC: 4.1 MMOL/L (ref 3.5–5)
PROT SERPL-MCNC: 6.7 G/DL (ref 6–8)
RBC # BLD AUTO: 4.63 MILL/UL (ref 3.8–5.4)
RHEUMATOID FACT SERPL-ACNC: <15 IU/ML (ref 0–30)
SODIUM SERPL-SCNC: 141 MMOL/L (ref 136–145)
WBC: 5.9 THOU/UL (ref 4–11)

## 2020-01-09 ASSESSMENT — MIFFLIN-ST. JEOR: SCORE: 1429.67

## 2020-01-10 LAB
ALBUMIN PERCENT: 68.1 % (ref 51–67)
ALBUMIN SERPL ELPH-MCNC: 4.6 G/DL (ref 3.2–4.7)
ALPHA 1 PERCENT: 2.4 % (ref 2–4)
ALPHA 2 PERCENT: 9.2 % (ref 5–13)
ALPHA1 GLOB SERPL ELPH-MCNC: 0.2 G/DL (ref 0.1–0.3)
ALPHA2 GLOB SERPL ELPH-MCNC: 0.6 G/DL (ref 0.4–0.9)
B-GLOBULIN SERPL ELPH-MCNC: 0.7 G/DL (ref 0.7–1.2)
BETA PERCENT: 9.6 % (ref 10–17)
GAMMA GLOB SERPL ELPH-MCNC: 0.7 G/DL (ref 0.6–1.4)
GAMMA GLOBULIN PERCENT: 10.7 % (ref 9–20)
PATH ICD:: ABNORMAL
PROT PATTERN SERPL ELPH-IMP: ABNORMAL
PROT SERPL-MCNC: 6.8 G/DL (ref 6–8)
REVIEWING PATHOLOGIST: ABNORMAL

## 2020-01-13 LAB — ANA SER QL: 2.2 U

## 2020-01-14 ENCOUNTER — COMMUNICATION - HEALTHEAST (OUTPATIENT)
Dept: FAMILY MEDICINE | Facility: CLINIC | Age: 45
End: 2020-01-14

## 2020-01-14 LAB
SS-A/RO AUTOANTIBODIES - HISTORICAL: 2 EU
SS-B/LA AUTOANTIBODIES - HISTORICAL: 0 EU

## 2020-01-15 LAB
CHOLEST SERPL-MCNC: 171 MG/DL
HDLC SERPL-MCNC: 62 MG/DL
LDLC SERPL CALC-MCNC: 88 MG/DL
TRIGL SERPL-MCNC: 106 MG/DL

## 2020-01-17 ENCOUNTER — PRE VISIT (OUTPATIENT)
Dept: NEUROLOGY | Facility: CLINIC | Age: 45
End: 2020-01-17

## 2020-01-24 ENCOUNTER — RECORDS - HEALTHEAST (OUTPATIENT)
Dept: ADMINISTRATIVE | Facility: OTHER | Age: 45
End: 2020-01-24

## 2020-02-20 ENCOUNTER — COMMUNICATION - HEALTHEAST (OUTPATIENT)
Dept: FAMILY MEDICINE | Facility: CLINIC | Age: 45
End: 2020-02-20

## 2020-02-25 ENCOUNTER — COMMUNICATION - HEALTHEAST (OUTPATIENT)
Dept: FAMILY MEDICINE | Facility: CLINIC | Age: 45
End: 2020-02-25

## 2020-02-25 DIAGNOSIS — L43.8 ORAL LICHEN PLANUS: ICD-10-CM

## 2020-06-01 ENCOUNTER — RECORDS - HEALTHEAST (OUTPATIENT)
Dept: ADMINISTRATIVE | Facility: OTHER | Age: 45
End: 2020-06-01

## 2020-07-09 ENCOUNTER — COMMUNICATION - HEALTHEAST (OUTPATIENT)
Dept: HEALTH INFORMATION MANAGEMENT | Facility: CLINIC | Age: 45
End: 2020-07-09

## 2020-07-29 ENCOUNTER — RECORDS - HEALTHEAST (OUTPATIENT)
Dept: ADMINISTRATIVE | Facility: OTHER | Age: 45
End: 2020-07-29

## 2020-09-03 ENCOUNTER — COMMUNICATION - HEALTHEAST (OUTPATIENT)
Dept: FAMILY MEDICINE | Facility: CLINIC | Age: 45
End: 2020-09-03

## 2020-09-04 ENCOUNTER — OFFICE VISIT - HEALTHEAST (OUTPATIENT)
Dept: FAMILY MEDICINE | Facility: CLINIC | Age: 45
End: 2020-09-04

## 2020-09-04 DIAGNOSIS — R07.89 ATYPICAL CHEST PAIN: ICD-10-CM

## 2020-09-04 DIAGNOSIS — R03.0 ELEVATED BLOOD PRESSURE READING WITHOUT DIAGNOSIS OF HYPERTENSION: ICD-10-CM

## 2020-09-04 DIAGNOSIS — R20.2 PARESTHESIAS: ICD-10-CM

## 2020-09-04 ASSESSMENT — MIFFLIN-ST. JEOR: SCORE: 1453.49

## 2020-09-08 ENCOUNTER — RECORDS - HEALTHEAST (OUTPATIENT)
Dept: ADMINISTRATIVE | Facility: OTHER | Age: 45
End: 2020-09-08

## 2020-09-11 ENCOUNTER — COMMUNICATION - HEALTHEAST (OUTPATIENT)
Dept: FAMILY MEDICINE | Facility: CLINIC | Age: 45
End: 2020-09-11

## 2020-09-21 ENCOUNTER — COMMUNICATION - HEALTHEAST (OUTPATIENT)
Dept: FAMILY MEDICINE | Facility: CLINIC | Age: 45
End: 2020-09-21

## 2020-09-27 ENCOUNTER — COMMUNICATION - HEALTHEAST (OUTPATIENT)
Dept: FAMILY MEDICINE | Facility: CLINIC | Age: 45
End: 2020-09-27

## 2020-09-28 ENCOUNTER — OFFICE VISIT - HEALTHEAST (OUTPATIENT)
Dept: FAMILY MEDICINE | Facility: CLINIC | Age: 45
End: 2020-09-28

## 2020-09-28 DIAGNOSIS — M99.01 SOMATIC DYSFUNCTION OF SPINE, CERVICAL: ICD-10-CM

## 2020-09-28 DIAGNOSIS — M99.08 SOMATIC DYSFUNCTION OF RIB: ICD-10-CM

## 2020-09-28 DIAGNOSIS — M99.04 SOMATIC DYSFUNCTION OF SACRAL REGION: ICD-10-CM

## 2020-09-28 DIAGNOSIS — M99.00 SOMATIC DYSFUNCTION OF HEAD REGION: ICD-10-CM

## 2020-09-28 DIAGNOSIS — M99.03 SOMATIC DYSFUNCTION OF SPINE, LUMBAR: ICD-10-CM

## 2020-09-28 DIAGNOSIS — R10.13 EPIGASTRIC PAIN: ICD-10-CM

## 2020-09-28 DIAGNOSIS — M99.02 SOMATIC DYSFUNCTION OF SPINE, THORACIC: ICD-10-CM

## 2020-09-28 DIAGNOSIS — R07.81 RIB PAIN: ICD-10-CM

## 2020-10-02 ENCOUNTER — COMMUNICATION - HEALTHEAST (OUTPATIENT)
Dept: FAMILY MEDICINE | Facility: CLINIC | Age: 45
End: 2020-10-02

## 2020-10-09 ENCOUNTER — COMMUNICATION - HEALTHEAST (OUTPATIENT)
Dept: FAMILY MEDICINE | Facility: CLINIC | Age: 45
End: 2020-10-09

## 2020-10-09 DIAGNOSIS — M54.6 CHRONIC THORACIC BACK PAIN, UNSPECIFIED BACK PAIN LATERALITY: ICD-10-CM

## 2020-10-09 DIAGNOSIS — R10.84 ABDOMINAL PAIN, GENERALIZED: ICD-10-CM

## 2020-10-09 DIAGNOSIS — G89.29 CHRONIC THORACIC BACK PAIN, UNSPECIFIED BACK PAIN LATERALITY: ICD-10-CM

## 2020-10-09 DIAGNOSIS — R07.81 RIB PAIN: ICD-10-CM

## 2020-10-09 DIAGNOSIS — M99.02 SOMATIC DYSFUNCTION OF SPINE, THORACIC: ICD-10-CM

## 2020-11-22 ENCOUNTER — HEALTH MAINTENANCE LETTER (OUTPATIENT)
Age: 45
End: 2020-11-22

## 2020-11-25 ENCOUNTER — OFFICE VISIT - HEALTHEAST (OUTPATIENT)
Dept: FAMILY MEDICINE | Facility: CLINIC | Age: 45
End: 2020-11-25

## 2020-11-25 DIAGNOSIS — Z28.21 INFLUENZA VACCINATION DECLINED: ICD-10-CM

## 2020-11-25 DIAGNOSIS — R19.8 ALTERED BOWEL FUNCTION: ICD-10-CM

## 2020-11-25 DIAGNOSIS — R10.10 CHRONIC UPPER ABDOMINAL PAIN: ICD-10-CM

## 2020-11-25 DIAGNOSIS — M79.18 MUSCULOSKELETAL PAIN: ICD-10-CM

## 2020-11-25 DIAGNOSIS — G89.29 CHRONIC UPPER ABDOMINAL PAIN: ICD-10-CM

## 2020-11-25 ASSESSMENT — MIFFLIN-ST. JEOR: SCORE: 1463.13

## 2020-11-30 ENCOUNTER — HOSPITAL ENCOUNTER (OUTPATIENT)
Dept: MAMMOGRAPHY | Facility: CLINIC | Age: 45
Discharge: HOME | End: 2020-11-30
Attending: FAMILY MEDICINE

## 2020-11-30 DIAGNOSIS — Z12.31 VISIT FOR SCREENING MAMMOGRAM: ICD-10-CM

## 2020-12-07 ENCOUNTER — COMMUNICATION - HEALTHEAST (OUTPATIENT)
Dept: FAMILY MEDICINE | Facility: CLINIC | Age: 45
End: 2020-12-07

## 2020-12-10 ENCOUNTER — COMMUNICATION - HEALTHEAST (OUTPATIENT)
Dept: FAMILY MEDICINE | Facility: CLINIC | Age: 45
End: 2020-12-10

## 2020-12-12 ENCOUNTER — HOSPITAL ENCOUNTER (OUTPATIENT)
Dept: CT IMAGING | Facility: CLINIC | Age: 45
Discharge: HOME | End: 2020-12-12
Attending: FAMILY MEDICINE

## 2020-12-12 DIAGNOSIS — G89.29 CHRONIC UPPER ABDOMINAL PAIN: ICD-10-CM

## 2020-12-12 DIAGNOSIS — R10.10 CHRONIC UPPER ABDOMINAL PAIN: ICD-10-CM

## 2021-01-04 ENCOUNTER — RECORDS - HEALTHEAST (OUTPATIENT)
Dept: ADMINISTRATIVE | Facility: OTHER | Age: 46
End: 2021-01-04

## 2021-02-13 ENCOUNTER — HEALTH MAINTENANCE LETTER (OUTPATIENT)
Age: 46
End: 2021-02-13

## 2021-05-24 ENCOUNTER — RECORDS - HEALTHEAST (OUTPATIENT)
Dept: ADMINISTRATIVE | Facility: CLINIC | Age: 46
End: 2021-05-24

## 2021-05-25 ENCOUNTER — RECORDS - HEALTHEAST (OUTPATIENT)
Dept: ADMINISTRATIVE | Facility: CLINIC | Age: 46
End: 2021-05-25

## 2021-05-26 ENCOUNTER — RECORDS - HEALTHEAST (OUTPATIENT)
Dept: ADMINISTRATIVE | Facility: CLINIC | Age: 46
End: 2021-05-26

## 2021-05-27 ENCOUNTER — RECORDS - HEALTHEAST (OUTPATIENT)
Dept: ADMINISTRATIVE | Facility: CLINIC | Age: 46
End: 2021-05-27

## 2021-05-27 NOTE — PROGRESS NOTES
Vitamin D level still mildly low, recommended increased dose of replacement to 2000 IU daily. Patient notified by [employer] message.  Nilam Valiente DO

## 2021-05-27 NOTE — TELEPHONE ENCOUNTER
Orders being requested: Vitamin D level   Reason service is needed/diagnosis: Patient last had this checked on 7/26/2018 and has concerns that a low vitamin D level could be causing he rib pain. She notes she was sporadically taking vitamin D during the winter months. Patient will be near the clinic tomorrow for a dental appointment and would like this order placed by then.  When are orders needed by: 4/09/2019  Where to send Orders: EPIC  Okay to leave detailed message?  Yes

## 2021-05-27 NOTE — TELEPHONE ENCOUNTER
New Appointment Needed  What is the reason for the visit:    Same Date/Next Day Appt Request  What is the reason for your visit?: Same day Lab. Patient is scheduled for today at 2:15pm    Provider Preference: Lab  How soon do you need to be seen?: today  Waitlist offered?: No  Okay to leave a detailed message:  Yes. But not needed

## 2021-05-27 NOTE — TELEPHONE ENCOUNTER
Left message for pt that lab order was entered for Vitamin D level.  Please assist pt in scheduling a lab only appt upon return call.

## 2021-05-27 NOTE — TELEPHONE ENCOUNTER
Dr. Valiente-  See pt's [facility] message and reply via [facility].

## 2021-05-28 NOTE — PROGRESS NOTES
[facility] Clinic Note    Arcadio Barber  1975   [id number]    Arcadio Barber is a 43 y.o. female presenting to discuss the following:     CC:   Chief Complaint   Patient presents with     Facial Swelling     Right eye       HPI:  RIGHT EYE SWELLING -   - intermittent swelling, comes and goes  - at area where eyelashes have been falling out  - saw the dermatologist  - has tried warm compresses, tried neosporin around the area  - sometimes looks like a little bump there.   - feels uncomfortable  - does not wear contacts     HEALTH MAINTENANCE -   - pap smear: s/p hysterectomy     Neelima states her dieter was diagnosed with an eating disorder, is working with [employer].  She is dealing with some anxiety and stress.  She is previously been on an SSRI medication, Remember which one.  But this was helpful, but also has some side effects with it.  Is considering if she should restart SSRI medication.    ROS:   CONSTITUTIONAL: No fevers, no chills  HEENT: no changes in vision, no drainage    PMH:   Patient Active Problem List   Diagnosis     Migraine Headache     Generalized Anxiety Disorder     Salivary Secretion Disturbance: Xerostomia     Numbness (Hypesthesia)     Endometriosis     Bunion     Fatigue     Pes Planus     Allergic Rhinitis       Past Medical History:   Diagnosis Date     Arrhythmia     PVC's on holter     Breast cyst      GERD (gastroesophageal reflux disease)      Iron Deficiency Anemia Secondary To Chronic Blood Loss     Created by Conversion      PONV (postoperative nausea and vomiting)        PSH:   Past Surgical History:   Procedure Laterality Date     BREAST BIOPSY Right 2003     BREAST CYST EXCISION       DIAGNOSTIC LAPAROSCOPY       EXPLORATORY LAPAROTOMY      remove endometriosis     LAPAROSCOPIC TOTAL HYSTERECTOMY N/A 12/10/2018    Procedure: ROBOTIC TOTAL LAPAROSCOPIC HYSTERECTOMY BILATERAL SALPINGECTOMY RIGHT OOPHORECTOMY CYSTOSCOPY;  Surgeon: Davis Bhatti MD;   "Location: [facility];  Service: Gynecology         MEDICATIONS:   Current Outpatient Medications on File Prior to Visit   Medication Sig Dispense Refill     cholecalciferol, vitamin D3, (VITAMIN D3) 1,000 unit capsule Take 1,000 Units by mouth daily.       hydrOXYzine pamoate (VISTARIL) 25 MG capsule Take 1-2 capsules (25-50 mg total) by mouth every 4 (four) hours as needed (mild to moderate pain). 10 capsule 0     lansoprazole (PREVACID) 30 MG capsule TAKE 1 CAPSULE(30 MG) BY MOUTH DAILY 90 capsule 2     oxyCODONE (ROXICODONE) 5 MG immediate release tablet Take 1-2 tablets (5-10 mg total) by mouth every 4 (four) hours as needed. 13 tablet 0     sucralfate (CARAFATE) 1 gram tablet Take 1 tablet (1 g total) by mouth 4 (four) times a day. 120 tablet 1     No current facility-administered medications on file prior to visit.        ALLERGIES:  Allergies   Allergen Reactions     Codeine Nausea And Vomiting     Penicillins Rash     Sulfa (Sulfonamide Antibiotics) Rash     PHYSICAL EXAM:   /72   Pulse 72   Temp 98.3  F (36.8  C) (Oral)   Resp 16   Ht 5' 8\" (1.727 m)   Wt 169 lb 1 oz (76.7 kg)   BMI 25.71 kg/m     GENERAL: Arcadio is a pleasant female, no acute distress.  HEENT: Sclera white, small bump mid right upper eyelid without punctate present.  No surrounding swelling of eyelid, warmth, or redness.  There is a small area of eyelash loss at the site of bump.  HEART: Regular rate and rhythm, no murmurs.  LUNGS: Clear to auscultation bilaterally, unlabored.  PSYCH: Sad appearing, anxious, affect congruent with mood.  Is appropriately groomed.    ASSESSMENT & PLAN:   Arcadio Barber is a 43 y.o. female presenting today for evaluation of bump on eyelid.    1. Hordeolum externum of right upper eyelid  2. Loss of eyelashes, right  Arcadio resents today with concerns for recurring bump of right eyelid as well as loss of eyelashes.  I am not concerned about periorbital cellulitis at this point in time " without induration, erythema, or warmth.  There is a prominent small bump consistent with stye/hordeolum.  Recommended conservative treatment with warm compresses and rewetting eyedrops for irritation.  If bump is persisting, she can see ophthalmology for possible incision and drainage.    She is already seen a dermatologist regarding loss of eyelashes, and was diagnosed with alopecia areata.  She has been offered topical Latisse.  She is considering this option.    RTC: November 2019 - annual physical exam / sooner as needed or to follow up mood if desired    Nilam Valiente, DO

## 2021-05-29 NOTE — PROGRESS NOTES
Normal RUQ US to evaluate for cause of persistent abdominal/rib pain. Patient updated by [employer].

## 2021-05-29 NOTE — PROGRESS NOTES
Normal CMP and lipase. Patient updated by MyChart. Will refer to GI for further evaluation of chronic diarrhea (> 4 wks) and chronic abdominal pain.   Nilam Valiente, DO

## 2021-05-29 NOTE — PROGRESS NOTES
[facility] Clinic Note    Arcadio Barber  1975   [id number]    Arcadio Barber is a 43 y.o. female presenting to discuss the following:     CC:   Chief Complaint   Patient presents with     GI Problem     HPI:  Still having rib pain, not comfortable not knowing, now having abdominal pain. Having episodes of diarrhea. Tried imodium and then was backed up.     Pain bilateral upper abdomen, pain is daily, taking ibuprofen daily for pain. Wraps around to the back. Severity comes and goes. If bends forward, feels like it catches or is uncomfortable.     Notices more epigastric discomfort with eating. Feels more bloated and uncomfortable at time. No vomiting, feels nauseated with diarrhea. No blood in stool.     Heartburn symptoms improved, is now off the prevacid.    Has had diagnostic laparoscopy and exploratory laparotomy for widespread endometriosis back in 2003.     Loose stools, 3-4 episodes weekly. Lasts a day.     ROS:   CONSTITUTIONAL: Has gained weight. No diet changes.   See HPI above.     PMH:   Patient Active Problem List   Diagnosis     Migraine Headache     Generalized Anxiety Disorder     Salivary Secretion Disturbance: Xerostomia     Numbness (Hypesthesia)     Endometriosis     Bunion     Fatigue     Pes Planus     Allergic Rhinitis       Past Medical History:   Diagnosis Date     Arrhythmia     PVC's on holter     Breast cyst      GERD (gastroesophageal reflux disease)      Iron Deficiency Anemia Secondary To Chronic Blood Loss     Created by Conversion      PONV (postoperative nausea and vomiting)        PSH:   Past Surgical History:   Procedure Laterality Date     BREAST BIOPSY Right 2003     BREAST CYST EXCISION       DIAGNOSTIC LAPAROSCOPY       EXPLORATORY LAPAROTOMY      remove endometriosis     LAPAROSCOPIC TOTAL HYSTERECTOMY N/A 12/10/2018    Procedure: ROBOTIC TOTAL LAPAROSCOPIC HYSTERECTOMY BILATERAL SALPINGECTOMY RIGHT OOPHORECTOMY CYSTOSCOPY;  Surgeon: Davis Bhatti MD;   "Location: [facility] OR;  Service: Gynecology         MEDICATIONS:   Current Outpatient Medications on File Prior to Visit   Medication Sig Dispense Refill     cholecalciferol, vitamin D3, (VITAMIN D3) 1,000 unit capsule Take 1,000 Units by mouth daily.       hydrOXYzine pamoate (VISTARIL) 25 MG capsule Take 1-2 capsules (25-50 mg total) by mouth every 4 (four) hours as needed (mild to moderate pain). 10 capsule 0     lansoprazole (PREVACID) 30 MG capsule TAKE 1 CAPSULE(30 MG) BY MOUTH DAILY 90 capsule 2     No current facility-administered medications on file prior to visit.        ALLERGIES:  Allergies   Allergen Reactions     Codeine Nausea And Vomiting     Penicillins Rash     Sulfa (Sulfonamide Antibiotics) Rash       PHYSICAL EXAM:   BP 90/60   Pulse 100   Temp 98.6  F (37  C) (Oral)   Resp 12   Ht 5' 8\" (1.727 m)   Wt 167 lb 3 oz (75.8 kg)   LMP 12/02/2018 (Exact Date)   BMI 25.42 kg/m     GENERAL: Arcadio is a pleasant, well appearing female in no acute distress.   HEART: Regular rate and rhythm, no murmurs.   LUNGS: Clear to auscultation bilaterally, unlabored.   ABDOMEN: Soft, mildly tender to palpation more prominent in epigastric and upper quadrants. No guarding, no rigidity, no rebound tenderness, no palpable masses or organomegaly.     ASSESSMENT & PLAN:   Arcadio Barber is a 43 y.o. female presenting for evaluation of abdominal pain.    1. Abdominal pain, generalized  - Comprehensive Metabolic Panel  - Lipase     Abdominal pain is subacute, intermittent. Abdominal exam is benign. No correlation identified with food intake to suggest gastritis/PUD/gallbladder pathology. Lose stool symptoms are intermittent, not consistent with infectious diarrhea, possibly IBS but diagnosis of exclusion and hasn't been present for 3 months yet. No blood in stool or weight loss to suggest IBD.     Discussed obtaining labs today to evaluate kidneys, liver, gallbladder, and pancreas.   If normal, will " discuss option of abdominal ultrasound vs CT scan vs referral to GI for further evaluation.     HM: PHQ9 completed, 2/27    RTC: pending results    Nilam Valiente, DO

## 2021-05-30 VITALS — BODY MASS INDEX: 24.92 KG/M2 | WEIGHT: 164.44 LBS | HEIGHT: 68 IN

## 2021-05-31 VITALS — WEIGHT: 166 LBS | HEIGHT: 68 IN | BODY MASS INDEX: 25.16 KG/M2

## 2021-06-01 VITALS — WEIGHT: 165.56 LBS | HEIGHT: 68 IN | BODY MASS INDEX: 25.09 KG/M2

## 2021-06-01 VITALS — WEIGHT: 164 LBS | BODY MASS INDEX: 24.86 KG/M2 | HEIGHT: 68 IN

## 2021-06-01 VITALS — HEIGHT: 68 IN | WEIGHT: 166 LBS | BODY MASS INDEX: 25.16 KG/M2

## 2021-06-01 VITALS — BODY MASS INDEX: 25.01 KG/M2 | HEIGHT: 68 IN | WEIGHT: 165 LBS

## 2021-06-01 NOTE — PATIENT INSTRUCTIONS - HE
Hold all supplements, aspirin and NSAIDs for 7 days prior to surgery.  Follow your surgeon's direction on when to stop eating and drinking prior to surgery.  Your surgeon will be managing your pain after your surgery.    Remove all jewelry and metal piercings before your surgery.   Remove nail polish from fingers before surgery.

## 2021-06-01 NOTE — PROGRESS NOTES
Preoperative Exam    Scheduled Procedure: Septoplasty  Surgery Date:  9/24/2019  Surgery Location: [facility], [place], fax [phone]    Surgeon:  Dr. Rush Aviles    Assessment/Plan:     1. Encounter for preoperative examination for general surgical procedure  2. Deviated nasal septum  - HM2(CBC w/o Differential)    Surgical Procedure Risk: Intermediate (reported cardiac risk generally 1-5%)  Have you had prior anesthesia?: Yes  Have you or any family members had a previous anesthesia reaction:  No  Do you or any family members have a history of a clotting or bleeding disorder?: No  Cardiac Risk Assessment: no increased risk for major cardiac complications    APPROVAL GIVEN to proceed with proposed procedure, without further diagnostic evaluation    Please Note: History of nausea and vomiting from anesthesia, desires to be premedicated for manage nausea.      Patient Instructions   Hold all supplements, aspirin and NSAIDs for 7 days prior to surgery.  Follow your surgeon's direction on when to stop eating and drinking prior to surgery.  Your surgeon will be managing your pain after your surgery.    Remove all jewelry and metal piercings before your surgery.   Remove nail polish from fingers before surgery.    Functional Status: Independent  Patient plans to recover at home with family.     Subjective:      Arcadio Barber is a 44 y.o. female who presents for a preoperative consultation. Due to persistent sinus symptoms and persistent accumulation of congestion in sinus, is scheduled to have this removed in addition to correction of deviated septum. Will have general anesthesia.     History of nausea and vomiting with surgeries, discusses with anesthesiologist up front.     No recent illnesses, no exertional chest pain, is regularly active.     All other systems reviewed and are negative, other than those listed in the HPI.    Pertinent History  Do you have difficulty breathing or chest pain  after walking up a flight of stairs: No  History of obstructive sleep apnea: No  Steroid use in the last 6 months: Yes: Was on steroids with antibiotics in April for sinus symptoms, prescribed by dentist.   Frequent Aspirin/NSAID use: No  Prior Blood Transfusion: No  Prior Blood Transfusion Reaction: No  If for some reason prior to, during or after the procedure, if it is medically indicated, would you be willing to have a blood transfusion?:  There is no transfusion refusal.    Current Outpatient Medications   Medication Sig Dispense Refill     cholecalciferol, vitamin D3, (VITAMIN D3) 1,000 unit capsule Take 1,000 Units by mouth daily.       hydrOXYzine pamoate (VISTARIL) 25 MG capsule Take 1-2 capsules (25-50 mg total) by mouth every 4 (four) hours as needed (mild to moderate pain). 10 capsule 0     lansoprazole (PREVACID) 30 MG capsule TAKE 1 CAPSULE(30 MG) BY MOUTH DAILY 90 capsule 2     No current facility-administered medications for this visit.     Not taking any medications currently.     Allergies   Allergen Reactions     Codeine Nausea And Vomiting     Penicillins Rash     Sulfa (Sulfonamide Antibiotics) Rash       Patient Active Problem List   Diagnosis     Migraine Headache     Generalized Anxiety Disorder     Salivary Secretion Disturbance: Xerostomia     Numbness (Hypesthesia)     Endometriosis     Bunion     Fatigue     Pes Planus     Allergic Rhinitis       Past Medical History:   Diagnosis Date     Arrhythmia     PVC's on holter     Breast cyst      GERD (gastroesophageal reflux disease)      Iron Deficiency Anemia Secondary To Chronic Blood Loss     Created by Conversion      PONV (postoperative nausea and vomiting)        Past Surgical History:   Procedure Laterality Date     BREAST BIOPSY Right 2003     BREAST CYST EXCISION       DIAGNOSTIC LAPAROSCOPY       EXPLORATORY LAPAROTOMY      remove endometriosis     LAPAROSCOPIC TOTAL HYSTERECTOMY N/A 12/10/2018    Procedure: ROBOTIC TOTAL  "LAPAROSCOPIC HYSTERECTOMY BILATERAL SALPINGECTOMY RIGHT OOPHORECTOMY CYSTOSCOPY;  Surgeon: Davis Bhatti MD;  Location: [facility] OR;  Service: Gynecology       Social History     Tobacco Use     Smoking status: Never Smoker     Smokeless tobacco: Never Used   Substance Use Topics     Alcohol use: Yes     Comment: occasional     Drug use: No     Patient Care Team:  Nilam Valiente DO as PCP - General (Family Medicine)  Nilam Valiente DO as Assigned PCP    Objective:     Vitals:    09/20/19 0944   BP: 110/78   Pulse: 100   Resp: 16   Temp: 98.2  F (36.8  C)   TempSrc: Oral   Weight: 167 lb 9 oz (76 kg)   Height: 5' 7.25\" (1.708 m)   LMP: 12/02/2018     Physical Exam:  Physical Exam   Constitutional: She is oriented to person, place, and time. She appears well-developed and well-nourished. No distress.   HENT:   Head: Normocephalic and atraumatic.   Right Ear: External ear normal.   Left Ear: External ear normal.   Nose: Nose normal.   Mouth/Throat: Oropharynx is clear and moist. No oropharyngeal exudate.   Eyes: Pupils are equal, round, and reactive to light. Conjunctivae and EOM are normal. Right eye exhibits no discharge. Left eye exhibits no discharge. No scleral icterus.   Neck: Normal range of motion. Neck supple. No thyromegaly present.   Cardiovascular: Normal rate, regular rhythm, normal heart sounds and intact distal pulses. Exam reveals no gallop and no friction rub.   No murmur heard.  Pulmonary/Chest: Effort normal and breath sounds normal. She has no wheezes. She has no rales.   Abdominal: Soft. Bowel sounds are normal. She exhibits no distension and no mass. There is no tenderness.   Musculoskeletal: Normal range of motion. She exhibits no edema or deformity.   Lymphadenopathy:     She has no cervical adenopathy.   Neurological: She is alert and oriented to person, place, and time. She exhibits normal muscle tone. Coordination normal.   Skin: Skin is warm and dry. No rash noted. She is not " diaphoretic.   Psychiatric: She has a normal mood and affect. Her behavior is normal. Judgment and thought content normal.      EKG:  Not indicated     Labs:  Recent Results (from the past 24 hour(s))   HM2(CBC w/o Differential)    Collection Time: 09/20/19 10:11 AM   Result Value Ref Range    WBC 7.2 4.0 - 11.0 thou/uL    RBC 4.73 3.80 - 5.40 mill/uL    Hemoglobin 13.7 12.0 - 16.0 g/dL    Hematocrit 40.5 35.0 - 47.0 %    MCV 86 80 - 100 fL    MCH 28.9 27.0 - 34.0 pg    MCHC 33.7 32.0 - 36.0 g/dL    RDW 11.5 11.0 - 14.5 %    Platelets 260 140 - 440 thou/uL    MPV 6.9 (L) 7.0 - 10.0 fL       Immunization History   Administered Date(s) Administered     Influenza [id number], 11/19/2009     Influenza, inj, historic,unspecified 10/02/2018     Influenza, nasal, historic 09/28/2011     Influenza, seasonal,quad inj 6-35 mos 10/22/2012     Influenza,live, Nasal Laiv4 10/23/2013     Influenza,seasonal,quad inj =/> 6months 10/27/2015, 09/19/2016, 10/23/2017     Tdap 11/30/2018     Electronically signed by Nilam Valiente DO 09/20/19 9:46 AM

## 2021-06-02 ENCOUNTER — RECORDS - HEALTHEAST (OUTPATIENT)
Dept: ADMINISTRATIVE | Facility: CLINIC | Age: 46
End: 2021-06-02

## 2021-06-02 VITALS — HEIGHT: 68 IN | WEIGHT: 160 LBS | BODY MASS INDEX: 24.25 KG/M2

## 2021-06-02 VITALS — HEIGHT: 68 IN | WEIGHT: 164 LBS | BODY MASS INDEX: 24.86 KG/M2

## 2021-06-02 VITALS — BODY MASS INDEX: 25.01 KG/M2 | WEIGHT: 165 LBS | HEIGHT: 68 IN

## 2021-06-02 VITALS — WEIGHT: 163.38 LBS | HEIGHT: 68 IN | BODY MASS INDEX: 24.76 KG/M2

## 2021-06-02 VITALS — BODY MASS INDEX: 25.31 KG/M2 | HEIGHT: 68 IN | WEIGHT: 167 LBS

## 2021-06-02 VITALS — WEIGHT: 162 LBS | BODY MASS INDEX: 24.55 KG/M2 | HEIGHT: 68 IN

## 2021-06-02 VITALS — WEIGHT: 165 LBS | HEIGHT: 68 IN | BODY MASS INDEX: 25.01 KG/M2

## 2021-06-03 VITALS
WEIGHT: 171 LBS | SYSTOLIC BLOOD PRESSURE: 128 MMHG | HEIGHT: 67 IN | OXYGEN SATURATION: 95 % | BODY MASS INDEX: 26.84 KG/M2 | HEART RATE: 83 BPM | DIASTOLIC BLOOD PRESSURE: 84 MMHG

## 2021-06-03 VITALS — BODY MASS INDEX: 25.34 KG/M2 | WEIGHT: 167.19 LBS | HEIGHT: 68 IN

## 2021-06-03 VITALS
BODY MASS INDEX: 26.86 KG/M2 | WEIGHT: 171.13 LBS | HEART RATE: 84 BPM | HEIGHT: 67 IN | SYSTOLIC BLOOD PRESSURE: 118 MMHG | TEMPERATURE: 98.2 F | RESPIRATION RATE: 20 BRPM | DIASTOLIC BLOOD PRESSURE: 80 MMHG

## 2021-06-03 VITALS
WEIGHT: 167.56 LBS | DIASTOLIC BLOOD PRESSURE: 78 MMHG | RESPIRATION RATE: 16 BRPM | HEIGHT: 67 IN | TEMPERATURE: 98.2 F | SYSTOLIC BLOOD PRESSURE: 110 MMHG | HEART RATE: 100 BPM | BODY MASS INDEX: 26.3 KG/M2

## 2021-06-03 VITALS — BODY MASS INDEX: 25.62 KG/M2 | WEIGHT: 169.06 LBS | HEIGHT: 68 IN

## 2021-06-03 NOTE — PROGRESS NOTES
He spoke with Neelima when she was in clinic today for her flu shot.  She has followed up with allergist and the eye doctor.  Both specialists have recommended an evaluation by neurology due to persistent symptoms.  She has an appointment scheduled with neurology at the [facility], requests a formal referral be placed.  Nilam Valiente, DO

## 2021-06-03 NOTE — PATIENT INSTRUCTIONS - HE
Assessment:    Allergic rhinitis with specific sensitivity to mold.  Specifically Alternaria which is generally an outdoor mold.    Plan:    Avoidance measures  Medication: Astelin 2 sprays each nostril twice daily can be used.    Follow-up as needed.

## 2021-06-03 NOTE — PROGRESS NOTES
Assessment:    Allergic rhinitis with specific sensitivity to mold.  Specifically Alternaria which is generally an outdoor mold.    Plan:    Avoidance measures regarding mold reviewed with patient.  Discussed possible indoor triggers as well as managing outdoor mold.  Medication: Astelin 2 sprays each nostril twice daily can be used.    Follow-up as needed.  Recommend 6 weeks if not improved.  ____________________________________________________________________________     Patient comes in today for allergy evaluation.  She has had a history of allergies.  Recently she developed a sinus infection January 2019.  She had several antibiotic courses of.  After that she was having symptoms of eye pressure on the left side.  Was uncomfortable.  She saw an ophthalmologist who prescribed allergy eyedrops which did not seem to help.  In March she saw a dentist because she was having pain in her left side of her upper teeth.  They reported cavities and a crown was placed.  They did an x-ray at that time and noticed a sinus infection.  The dentist prescribed a azithromycin and a 5-day course of prednisone.  Her symptoms did not improve however.  She continued to have sinus pressure and was seen by ENT.  They did empiric therapy for sinus infection.  Ceftin was prescribed for 3 weeks.  After completing that she was no better and a CT scan was done which showed left sided sinusitis.  On September 24 she had sinus surgery.  They found a walled off sinusitis infection.  She also had a deviated nasal septum that was repaired.  However since that time she still continues to have some symptoms although improved.  She is here for allergy evaluation to see if that may be playing a role.    Review of symptoms:  As above, otherwise negative    Past medical history: Gastroesophageal reflux.  Migraine headaches.  History of anxiety disorder.    Allergies: No known allergies to latex, foods or hymenoptera venom.  Penicillin, sulfa  antibiotics causing rash.  Nausea and vomiting with codeine.    Family history: Daughter with allergies and asthma.    Social history: Currently lives in house with forced air heat and central air conditioning.  There is a basement present.  They do see some mold at the windows.  No water leak still.  They have had a dog in the home for 8 years.  No cigarette smoking history.  She works as a registered nurse.    Medications: reviewed in chart    Physical Exam:  General:  Alert and in no apparent distress.  Eyes:  Sclera clear.  Ears: TMs translucent grey with bony landmarks visible. Nose: Pale, boggy mucosal membranes.  Throat: Pink, moist.  No lesions.  Neck: Supple.  No lymphadenopathy.  Lungs: CTA.  CV: Regular rate and rhythm. Extremities: Well perfused.  No clubbing or cyanosis. Skin: No rash    Last Percutaneous Allergy Test Results  Trees  Bethel, White  1:20 H  (W/F in mm): 0-0 (11/21/19 1619)  Birch Mix 1:20 H (W/F in mm): 0-0 (11/21/19 1619)  Dauphin, Common 1:20 H (W/F in mm): 0-0 (11/21/19 1619)  Elm, American 1:20 H (W/F in mm): 0-0 (11/21/19 1619)  Clinton, Shagbark 1:20 H (W/F in mm): 0-0 (11/21/19 1619)  Maple, Hard/Sugar 1:20 H (W/F in mm): 0-0 (11/21/19 1619)  Lawrence Mix 1:20 H (W/F in mm): 0-0 (11/21/19 1619)  Oak, Red 1:20 H (W/F in mm): 0-0 (11/21/19 1619)  Lovely, American 1:20 H (W/F in mm): 0-0 (11/21/19 1619)  Enfield Tree 1:20 H (W/F in mm): 0-0 (11/21/19 1619)  Dust Mites  D. Pteronyssinus Mite 30,000 AU/ML H (W/F in mm): 0-0 (11/21/19 1619)  D. Farinae Mite 30,000 AU/ML H (W/F in mm: 0-0 (11/21/19 1619)  Grasses  Grass Mix #4 10,000 BAU/ML H: 0-0 (11/21/19 1619)  Sunil Grass 1:20 H (W/F in mm): 0-0 (11/21/19 1619)  Cockroach  Cockroach Mix 1:10 H (W/F in mm): 0-0 (11/21/19 1619)  Molds/Fungi  Alternaria Tenuis 1:10 H (W/F in mm): 9-15 (11/21/19 1619)  Aspergillus Fumigatus 1:10 H (W/F in mm): 0-0 (11/21/19 1619)  Homodendrum Cladosporioides 1:10 H (W/F in mm): 0-0 (11/21/19  1619)  Penicillin Notatum 1:10 H (W/F in mm): 0-0 (11/21/19 1619)  Epicoccum 1:10 H (W/F in mm): 0-0 (11/21/19 1619)  Weeds  Ragweed, Short 1:20 H (W/F in mm): 0-0 (11/21/19 1619)  Dock, Axis 1:20 H (W/F in mm): 0-0 (11/21/19 1619)  Lamb's Quarter 1:20 H (W/F in mm): 0-0 (11/21/19 1619)  Pigweed, Rough Red Root 1:20 H  (W/F in mm): 0-0 (11/21/19 1619)  Plantain, English 1:20 H  (W/F in mm): 0-0 (11/21/19 1619)  Sagebrush, Mugwort 1:20 H  (W/F in mm): 0-0 (11/21/19 1619)  Animal  Cat 10,000 BAU/ML H (W/F in mm): 0-0 (11/21/19 1619)  Dog 1:10 H (W/F in mm): 0-0 (11/21/19 1619)  Controls  Device Type: QUINTIP (11/21/19 1619)  Neg. control: 50% Glycerine/Saline H (W/F in mm): 0-0 (11/21/19 1619)  Pos. control: Histamine 6mg/ML (W/F in mms): 5-15 (11/21/19 1619)     Patient seen for Dr. Nilam Valiente for evaluation of allergies

## 2021-06-03 NOTE — PROGRESS NOTES
[facility] Note    Name: Arcadio Barber  : 1975   MRN: [id number]    Arcadio Barber is a 44 y.o. female presenting to discuss the following:     CC:   Chief Complaint   Patient presents with     Annual Exam     HPI:  Hiatal hernia/reflux symptoms.  Longstanding history of reflux symptoms. Has remote history of EGD for diagnosis. Has taken prevacid in the past, worked well. Saw GI for further evaluation of epigastric symptoms, recommended restarting PPI and was prescribed protonix. Didn't tolerate this medication well. Is wondering if we can refill protonix.     Weight.  Eating has become difficult at home with family struggles of food intake and fixation. Has noted she is gaining weight. Knows all the right things to do regarding exercise and food choices, but feeling overwhelmed.     Mood.  Food is stressed and down, feeling more irritable. Is worried about starting an SSRI with possible weight gain.    Rib pain.   Talked with gynecologist, didn't see adhesions, history of endometriosis. Worried about creating more scar tissue and adhesions if goes back for exploratory laparoscopy. Symptoms are not cyclical constant. S/p hysterectomy, has 1 remaining functioning ovary, not yet perimenopausal by symptoms. History of sarcoma, worried about this despite CT scan last year. Has tried PT. Saw GI. Frustrated with persistent pain and can't go on living like this.     Recovering from sinus surgery.     Health Maintenance:   - Has advanced care directive  - Already has flu shot  - Up to date on flu and tetanus booster  - mammogram scheduled   - pap smears no longer indicated s/p hysterectomy, her GYN continues to check vaginal cuff annually.      ROS:   See HPI above.     PMH:   Patient Active Problem List   Diagnosis     Migraine Headache     Generalized Anxiety Disorder     Salivary Secretion Disturbance: Xerostomia     Numbness (Hypesthesia)     Endometriosis     Bunion     Fatigue     Pes Planus      Allergic Rhinitis       Past Medical History:   Diagnosis Date     Arrhythmia     PVC's on holter     Breast cyst      GERD (gastroesophageal reflux disease)      Iron Deficiency Anemia Secondary To Chronic Blood Loss     Created by Conversion      PONV (postoperative nausea and vomiting)        PSH:   Past Surgical History:   Procedure Laterality Date     BREAST BIOPSY Right 2003     BREAST CYST EXCISION       DIAGNOSTIC LAPAROSCOPY       EXPLORATORY LAPAROTOMY      remove endometriosis     LAPAROSCOPIC TOTAL HYSTERECTOMY N/A 12/10/2018    Procedure: ROBOTIC TOTAL LAPAROSCOPIC HYSTERECTOMY BILATERAL SALPINGECTOMY RIGHT OOPHORECTOMY CYSTOSCOPY;  Surgeon: Davis Bhatti MD;  Location: [facility];  Service: Gynecology         MEDICATIONS:   Current Outpatient Medications on File Prior to Visit   Medication Sig Dispense Refill     cholecalciferol, vitamin D3, (VITAMIN D3) 1,000 unit capsule Take 1,000 Units by mouth daily.       fluticasone propionate (FLONASE) 50 mcg/actuation nasal spray 2 sprays into each nostril daily.       [DISCONTINUED] hydrOXYzine pamoate (VISTARIL) 25 MG capsule Take 1-2 capsules (25-50 mg total) by mouth every 4 (four) hours as needed (mild to moderate pain). 10 capsule 0     [DISCONTINUED] lansoprazole (PREVACID) 30 MG capsule TAKE 1 CAPSULE(30 MG) BY MOUTH DAILY 90 capsule 2     No current facility-administered medications on file prior to visit.        ALLERGIES:  Allergies   Allergen Reactions     Codeine Nausea And Vomiting     Penicillins Rash     Sulfa (Sulfonamide Antibiotics) Rash       FAMHx:  Family History   Problem Relation Age of Onset     Sudden death Mother      Cancer Father      Cancer Sister      No Medical Problems Daughter      No Medical Problems Maternal Grandmother      No Medical Problems Maternal Grandfather      No Medical Problems Paternal Grandmother      No Medical Problems Paternal Grandfather      No Medical Problems Maternal Aunt      No Medical  "Problems Paternal Aunt      BRCA 1/2 Neg Hx      Breast cancer Neg Hx      Colon cancer Neg Hx      Endometrial cancer Neg Hx      Ovarian cancer Neg Hx        SOCIAL HISTORY:   Social History     Tobacco Use     Smoking status: Never Smoker     Smokeless tobacco: Never Used   Substance Use Topics     Alcohol use: Yes     Comment: occasional     Drug use: No       PHYSICAL EXAM:   /80   Pulse 84   Temp 98.2  F (36.8  C) (Oral)   Resp 20   Ht 5' 7\" (1.702 m)   Wt 171 lb 2 oz (77.6 kg)   LMP 12/02/2018 (Exact Date)   BMI 26.80 kg/m     GENERAL: Arcadio is a well appearing female, appears a healthy weight, in no acute distress.   HEENT: Sclera white, no nasal discharge, oropharynx pink and moist, no cervical lymphadenopathy, no thyromegaly.   HEART: Regular rate and rhythm, no murmurs.   LUNGS: Clear to auscultation bilaterally, unlabored.   ABDOMEN: Soft, non-tender to palpation.   MSK: No deformities or effusions.   NEURO: Normal gait, face symmetrical, normal speech.   PSYCH: Mood is stressed, normal affect, appropriately groomed, normal eye contact.     ASSESSMENT & PLAN:   Arcadio Barber is a 44 y.o. female presenting today for physical.    1. Annual physical exam  Reviewed health maintenance recommendations and past medical history. She has a health directive at home already. Arcadio expresses concerns about weight management. BMI just into overweight category. Discussed decreased basal metabolic weight with age, recommendations for exercise and strength training for weight maintenance vs weight loss, and eating a well balanced diet with attention to caloric intake.     2. GERD (gastroesophageal reflux disease)  3. Hiatal hernia  - lansoprazole (PREVACID) 30 MG capsule; Take 1 capsule (30 mg total) by mouth daily.  Dispense: 90 capsule; Refill: 1    Chronic reflux secondary to hiatal hernia. Didn't tolerate protonix. Requests refill of prevacid.     4. Mood change  Mood is down, lots of stress in " life. Hesitant to use SSRIs as concerned about weight gain. Will review Bond SSRI choosing guide with patient.     5. Chronic Back Pain  We have reviewed symptoms at several visits previously. She had a normal CT scan in February 2019. She saw GI and was restarted on PPI. She discussed with her GYN about possibility of persistent endometriosis. She has completed physical therapy.     Recommended she try a chiropractor to see if manipulation is helpful for management of symptoms.     6. Lipid screening  - Lipid [place] FASTING; Future     Due for lipid screening.     RTC: 1 year - annual physical exam, sooner as needed.     Nilam Valiente, DO

## 2021-06-04 VITALS
BODY MASS INDEX: 27.19 KG/M2 | DIASTOLIC BLOOD PRESSURE: 90 MMHG | HEART RATE: 81 BPM | WEIGHT: 173.25 LBS | HEIGHT: 67 IN | SYSTOLIC BLOOD PRESSURE: 134 MMHG

## 2021-06-04 VITALS
RESPIRATION RATE: 16 BRPM | BODY MASS INDEX: 26.37 KG/M2 | HEART RATE: 92 BPM | WEIGHT: 168 LBS | DIASTOLIC BLOOD PRESSURE: 80 MMHG | SYSTOLIC BLOOD PRESSURE: 120 MMHG | HEIGHT: 67 IN

## 2021-06-05 ENCOUNTER — RECORDS - HEALTHEAST (OUTPATIENT)
Dept: MAMMOGRAPHY | Facility: HOSPITAL | Age: 46
End: 2021-06-05

## 2021-06-05 VITALS
BODY MASS INDEX: 27.53 KG/M2 | WEIGHT: 175.38 LBS | SYSTOLIC BLOOD PRESSURE: 130 MMHG | HEIGHT: 67 IN | HEART RATE: 90 BPM | DIASTOLIC BLOOD PRESSURE: 82 MMHG

## 2021-06-05 VITALS
BODY MASS INDEX: 27.41 KG/M2 | OXYGEN SATURATION: 100 % | RESPIRATION RATE: 18 BRPM | WEIGHT: 175 LBS | DIASTOLIC BLOOD PRESSURE: 83 MMHG | HEART RATE: 91 BPM | SYSTOLIC BLOOD PRESSURE: 136 MMHG | TEMPERATURE: 98.3 F

## 2021-06-05 DIAGNOSIS — N63.0 LUMP OR MASS IN BREAST: ICD-10-CM

## 2021-06-05 NOTE — TELEPHONE ENCOUNTER
Dr. Valiente-  See pt's Domino message and reply via Domino.  Pt has already had A1C on 12/26/19 and lipids were done on 11/15/19.

## 2021-06-05 NOTE — PROGRESS NOTES
[facility] Note    Name: Arcadio Barber  : 1975   MRN: [id number]    Arcadio Barber is a 44 y.o. female presenting to discuss the following:     CC:   Chief Complaint   Patient presents with     Follow-up     HPI:  Arcadio presents today to follow up from recent appointments.     She saw neurology. B12 low normal, is going to start a supplement. Normal EMG. MRI head is normal. There was concern for syrinx in thoracic spine. Now scheduled to see a neurosurgeon for consultation.    Is worried about recent onset of dry mouth, needing to use biotin to help keep mouth moist. Has a history of abnormal autoimmune labs but didn't find an explanation previously.    Had a patch on inner left mouth which was rough. Saw dentist, referred to oral surgeon. Oral surgeon had scheduled to return for a biopsy. Symptoms have evolved since initial consult and then when returned surgeon decided no area to biopsy. Still thinks there is a little bit of change present and wonders if would benefit from a second opinion.     ROS:   See HPI above.     PMH:   Patient Active Problem List   Diagnosis     Migraine Headache     Generalized Anxiety Disorder     Salivary Secretion Disturbance: Xerostomia     Numbness (Hypesthesia)     Endometriosis     Bunion     Fatigue     Pes Planus     Allergic Rhinitis     Past Medical History:   Diagnosis Date     Arrhythmia     PVC's on holter     Breast cyst      GERD (gastroesophageal reflux disease)      Iron Deficiency Anemia Secondary To Chronic Blood Loss     Created by Conversion      PONV (postoperative nausea and vomiting)      PSH:   Past Surgical History:   Procedure Laterality Date     BREAST EXCISIONAL BIOPSY Right      DIAGNOSTIC LAPAROSCOPY       EXPLORATORY LAPAROTOMY      remove endometriosis     HYSTERECTOMY  2018     LAPAROSCOPIC TOTAL HYSTERECTOMY N/A 12/10/2018    Procedure: ROBOTIC TOTAL LAPAROSCOPIC HYSTERECTOMY BILATERAL SALPINGECTOMY RIGHT OOPHORECTOMY  "CYSTOSCOPY;  Surgeon: Davis Bhatti MD;  Location: [facility];  Service: Gynecology     OOPHORECTOMY  2018    One removed     MEDICATIONS:   Current Outpatient Medications on File Prior to Visit   Medication Sig Dispense Refill     cholecalciferol, vitamin D3, (VITAMIN D3) 1,000 unit capsule Take 1,000 Units by mouth daily.       fluticasone propionate (FLONASE) 50 mcg/actuation nasal spray 2 sprays into each nostril daily.       lansoprazole (PREVACID) 30 MG capsule Take 1 capsule (30 mg total) by mouth daily. 90 capsule 1     No current facility-administered medications on file prior to visit.      ALLERGIES:  Allergies   Allergen Reactions     Codeine Nausea And Vomiting     Protonix [Pantoprazole]      Penicillins Rash     Sulfa (Sulfonamide Antibiotics) Rash     PHYSICAL EXAM:   /80   Pulse 92   Resp 16   Ht 5' 7\" (1.702 m)   Wt 168 lb (76.2 kg)   LMP 12/02/2018 (Exact Date)   BMI 26.31 kg/m     GENERAL: Arcadio is a pleasant, well appearing female in no acute distress.   HEENT: There is a small area of erythema right buccal mucosa with white linear discoloration posteriorly.   HEART: Regular rate and rhythm, no murmurs.   LUNGS: Clear to auscultation bilaterally, unlabored.     ASSESSMENT & PLAN:   Arcadio Barber is a 44 y.o. female presenting for follow up of a few concerns today.    1. Dry mouth  - HM2(CBC w/o Differential)  - Comprehensive Metabolic Panel  - Antinuclear Antibody (NELLA) [place]  - C-Reactive Protein(CRP)  - Sedimentation Rate  - SS-A/RO Auto Antibodies  - SS-B/LA Auto Antibodies  - Rheumatoid Factor Quant  - Electrophoresis, Protein, Serum    With complaint of dry mouth symptoms and report of previous abnormal immune labs, will screen for sjogren's syndrome.    2. Oral lesion  Reassuring that lesion was resolving on follow up and didn't need biopsy. Recommend continuing to monitor over the next 4-6 weeks and if worsening again, can refer to different oral surgeon " for a second opinion.     3. Abnormal MRI, spine  I am glad to hear she is following up with neurosurgeon. Will await their recommendations.     RTC: November 2020 - annual physical exam / sooner as needed     Nilam Valiente, DO

## 2021-06-10 NOTE — PROGRESS NOTES
"Assessment/Plan:    Arcadio Barber is a 41 y.o. female presenting for:    1. Heart palpitations  Unclear etiology.  Potentially atrial fibrillation however she did state that she believed her rhythm was normal at the time.  EKG will be done today.  I have given her the option for a Holter monitor.  She will monitor her symptoms.  If she continues to have frequent symptoms she will send me in my chart message and will have her get this set up.  Otherwise basic laboratory tests is done below.  If she has the symptoms associated with lightheadedness or shortness of breath or chest pain she needs to proceed to the emergency department immediately.  - Electrocardiogram Perform and Read  - Basic Metabolic Panel  - Thyroid Cascade  - HM2(CBC w/o Differential)  - Iron and Transferrin Iron Binding Capacity    I have personally reviewed the EKG and find it to be a normal sinus rhythm with a rate of 72 bpm.  No ST or T-wave abnormalities.    Medications Discontinued During This Encounter   Medication Reason     citalopram (CELEXA) 20 MG tablet Therapy completed           Chief Complaint:  Chief Complaint   Patient presents with     Irregular Heart Beat     Felt a \"quivering\" sensation- started on Sunday and all day Monday- nothing yet today       Subjective:   Arcadio Barber is a very pleasant 41-year-old female with essentially no past medical problems presenting to the clinic today with concerns over an abnormal heart rhythm.  The patient states that she has never had this issue previously.  2 days ago she noticed that her left chest was \"quivering.\"  This lasted for a few minutes.  It happens several times that day.  She did not notice any increased stress.  She had not had excessive amount of caffeine or feel much anxiety.  No new medications.  When this was happening she took her pulse and noted that it was normal.  She was also able to listen to her heartbeat and found it to be regular.  She went to bed that night " "but the next day when she awoke she had a similar issue.  She states that when she was up picking up toys or moving about she would notice this \"quivering\" sensation but when she would rest it would go away.  She denies any shortness of breath, chest pain or lightheadedness with this.    She does have a history of anemia but states that her blood levels have been normal on recent checks.  No history of thyroid problems.  The only family history of cardiovascular issues is that her father has high blood pressure.    12 point review of systems completed and negative except for what has been described above    History   Smoking Status     Never Smoker   Smokeless Tobacco     Not on file       No current outpatient prescriptions on file.         Objective:  Vitals:    04/18/17 1046   BP: 118/68   Pulse: 76   Resp: 16   Temp: 98.6  F (37  C)   TempSrc: Oral   Weight: 164 lb 7 oz (74.6 kg)   Height: 5' 8\" (1.727 m)       Vital signs reviewed and stable  General: No acute distress  Psych: Appropriate affect  HEENT: moist mucous membranes, pupils equal, round, reactive to light and accomodation, posterior oropharynx clear of erythema or exudate, tympanic membranes are pearly grey bilaterally  Lymph: no cervical or supraclavicular lymphadenopathy  Cardiovascular: regular rate and rhythm with no murmur  Pulmonary: clear to auscultation bilaterally with no wheeze  Abdomen: soft, non tender, non distended with normo-active bowel sounds  Extremities: warm and well perfused with no edema  Skin: warm and dry with no rash         This note has been dictated and transcribed using voice recognition software.   Any errors in transcription are unintentional and inherent to the software.  "

## 2021-06-11 NOTE — TELEPHONE ENCOUNTER
Called pt and left VM. If pt calls back, please ask the following questions and document in the travel screening.  1. In the last month, have you been in contact with someone who was confirmed or suspected to have the Corona Virus/COVID-19?  2. Do you have Cough, Fever, Rash or shortness of breath?  3. Have you traveled internationally in the last month?  Please remind pt that their appt is at the [facility] and to wear a mask to the appt.

## 2021-06-11 NOTE — PROGRESS NOTES
"  Chief Complaint   Patient presents with     Rash     Rt side of rt foot x2 wks itchy         HPI:   Arcadio Barber is a 41 y.o. female has pruritic spot on outside of right foot.  Burning sensation. Constant.    Has tried hydrocortisone cream, triamcinolone, antifungal pain.  No spreading.    ROS:  A 12 point comprehensive review of systems was negative except as noted.     Medications:  No current outpatient prescriptions on file prior to visit.     No current facility-administered medications on file prior to visit.          Social History:  Social History   Substance Use Topics     Smoking status: Never Smoker     Smokeless tobacco: Not on file     Alcohol use Not on file         Physical Exam:   Vitals:    06/15/17 1527   BP: 122/74   Patient Site: Left Arm   Patient Position: Sitting   Cuff Size: Adult Regular   Pulse: 88   Resp: 16   Temp: 98.3  F (36.8  C)   TempSrc: Oral   Weight: 166 lb (75.3 kg)   Height: 5' 8\" (1.727 m)       GEN: NAD  Right foot:  Lateral aspect red patch about quarter size in size with several 2-3 mm smaller patches  No scale        Assessment/Plan:    1. Dermatitis  triamcinolone (KENALOG) 0.1 % ointment     Discussed eczema type dermatitis vs fungal.  Likely she didn't use treatment long enough to see improvement.  Antihistamine for itching.  Trial of up to two weeks of triamcinolone.  May use an antifungal with it.  Recheck if worsening or not improving.        The following portions of the patient's history were reviewed and updated as appropriate: allergies, current medications, past family history, past medical history, past social history, past surgical history and problem list.    Johana Edwards MD      6/15/2017        "

## 2021-06-11 NOTE — PROGRESS NOTES
[facility] Note    Name: Arcadio Barber  : 1975   MRN: [id number]    Arcadio Barber is a 44 y.o. female presenting to discuss the following:     CC:   Chief Complaint   Patient presents with     Numbness     Mostly in right thigh     Chest Pain     rib pain, lower mostly on the right       HPI:  CHEST PAIN:   Arcadio reports concerns for ongoing pain in epigastric region. She knows we have investigated this in great deal, but as a nurse, still feels uncomfortable with unexplained symptoms and worries something bad may be going on. She had discussed symptoms with her PT and wonders if we have investigated her gallbladder yet. Pain is still described as a band around her chest.     NUMBNESS:   She is experiencing some paresthesias, tingling sensation along anterior thighs. She denies any accidents or injuries recently. No focal weakness, no bowel or bladder incontinence. She wonders if this is related to her syrinx in cervical spine region. Scheduled to see neurology again in 6 months.    ROS:   See HPI above.     PMH:   Patient Active Problem List   Diagnosis     Migraine Headache     Generalized Anxiety Disorder     Salivary Secretion Disturbance: Xerostomia     Numbness (Hypesthesia)     Endometriosis     Bunion     Fatigue     Pes Planus     Allergic Rhinitis       Past Medical History:   Diagnosis Date     Arrhythmia     PVC's on holter     Breast cyst      GERD (gastroesophageal reflux disease)      Iron Deficiency Anemia Secondary To Chronic Blood Loss     Created by Conversion      PONV (postoperative nausea and vomiting)        PSH:   Past Surgical History:   Procedure Laterality Date     BREAST EXCISIONAL BIOPSY Right      DIAGNOSTIC LAPAROSCOPY       EXPLORATORY LAPAROTOMY      remove endometriosis     HYSTERECTOMY  2018     LAPAROSCOPIC TOTAL HYSTERECTOMY N/A 12/10/2018    Procedure: ROBOTIC TOTAL LAPAROSCOPIC HYSTERECTOMY BILATERAL SALPINGECTOMY RIGHT OOPHORECTOMY CYSTOSCOPY;   "Surgeon: Davis Bhatti MD;  [place] [facility] [place]ne[place]     OOPHORECTOMY  2018    One removed         MEDICATIONS:   Current Outpatient Medications on File Prior to Visit   Medication Sig Dispense Refill     cholecalciferol, vitamin D3, (VITAMIN D3) 1,000 unit capsule Take 1,000 Units by mouth daily.       clobetasoL (TEMOVATE) 0.05 % Gel Dry effected area with guaze. Apply small amount three times a day. Avoid food or drink for 30 minutes after application. 30 each 1     fluticasone propionate (FLONASE) 50 mcg/actuation nasal spray 2 sprays into each nostril daily.       No current facility-administered medications on file prior to visit.        ALLERGIES:  Allergies   Allergen Reactions     Codeine Nausea And Vomiting     Protonix [Pantoprazole]      Penicillins Rash     Sulfa (Sulfonamide Antibiotics) Rash       FAMHx:  Family History   Problem Relation Age of Onset     Sudden death Mother      Cancer Father      Cancer Sister        SOCIAL HISTORY:   Social History     Tobacco Use     Smoking status: Never Smoker     Smokeless tobacco: Never Used   Substance Use Topics     Alcohol use: Yes     Comment: occasional     Drug use: No         PHYSICAL EXAM:   /90   Pulse 81   Ht 5' 7\" (1.702 m)   Wt 173 lb 4 oz (78.6 kg)   LMP 12/02/2018 (Exact Date)   BMI 27.13 kg/m     GENERAL: Arcadio is a pleasant, well appearing female, in no acute distress.   NEURO: Speech intact, face symmetrical, normal gait, moving all extremities without difficulty. No gross deficits present.     ASSESSMENT & PLAN:   Arcadio Barber is a 44 y.o. female presenting today for follow up of persistent symptoms. As symptoms are not changing, did not perform physical exam today. We spent the majority of our visit reviewing previous evaluations and plan to help address symptoms moving forward. Acknowledged her frustrations with persistent, unexplained symptoms.     1. Atypical chest pain  We have evaluated " this extensively in the past.     CT scan completed at outside facility on 2/28/19 showed a 2mm right lung nodule, no follow up indicated per Fleischner guidelines. We have obtained normal CMP in June 2019 and again in January 2020. Checked RUQ US to evaluate for gallbladder abnormalities in June 2019 which was normal. We have referred to PT for musculoskeletal pain. We have referred to GI for evaluation in July 2019, H pylori testing negative, recommended Protonix, low FODMAP diet, and follow up in 6 weeks if not improving.     Reassured Arcadio that we have not identified any underlying cause with extensive evaluation and suspect symptoms are musculoskeletal in etiology and benign. Even though we haven't identified a specific cause on imaging, doesn't mean her symptoms aren't real, but are likely benign and should continue with symptom management.    She is interested in manual therapy and asks about chiropractor. Discussed option of chiropractor vs osteopathic manual therapy. Recommend she see my partner Dr. Ibarra for manual therapy.     2. Paresthesias  Symptoms have been long standing, not progressive. No red flags present. We did refer to neurology for evaluation of possible underlying neurological explanation such as MS given persistent, unexplained symptoms. Head MRI normal but did find a syrinx in cervical spine. She is following with neurology for monitoring of this. Defer to neurology whether or not this may explain her thigh paresthesia symptoms. Discussed investigating lumbar region as well. She had a lumbar MRI for these symptoms in November 2018 without any significant findings. As no significant changes, injuries, or accidents, do not recommend repeat imaging at this time. Again, may have benefit from manual therapy. Continue to follow with neurology.     MR LUMBAR SPINE WO CONTRAST 11/16/2018 12:41 PM  1.  Mild annular bulge at L5-S1 with a small circumferential tear.  2.  No central spinal canal  or neural foraminal stenosis at any lumbar interspace level.  3.  Likely moderate-sized ovarian dermoid. Further evaluation with routine pelvic ultrasound is recommended.    3. Elevated blood pressure reading without diagnosis of hypertension  Blood pressure is borderline elevated today. This is abnormal for her. She does endorse a lot of stress right now with pandemic and job insecurity under new ownership. She does have a home BP cuff. I will reach out to Arcadio in 1 month to recheck her blood pressure readings.      HM: Declines flu shot today.     RTC: Will discuss case with Dr. Ibarra for possible manual therapy options. Will follow up with BP in 1 month by [facility] message.    Nilam Valiente, DO

## 2021-06-11 NOTE — PROGRESS NOTES
ASSESSMENT and PLAN:  Arcadio was seen today for chest pain.    Diagnoses and all orders for this visit:    Rib pain    Epigastric pain    Somatic dysfunction of head region    Somatic dysfunction of spine, cervical    Somatic dysfunction of spine, thoracic    Somatic dysfunction of rib    Somatic dysfunction of spine, lumbar    Somatic dysfunction of sacral region      Patient has ongoing pain with unclear etiology.  She has not had CT imaging which I would advise or consider as well as an upper GI to make sure not missing anything more concerning.  She was treated today with osteopathic manual therapy and hopefully she will have some improvement with this that she did seem like there was a lot of congestion in her body where her rib muscles and fascia were not moving well.  Potentially she would benefit from ongoing manual therapy.  I will follow-up with her in a week to see how she is doing via my chart to decide where to go from here.  Would consider with all of her abdominal surgeries and biopsies that potentially she has some scar tissue that is causing some of her symptoms.  Could consider laparotomy as well    Patient Instructions   -consider CT or upper GI.       No orders of the defined types were placed in this encounter.    There are no discontinued medications.    No follow-ups on file.    DATA REVIEWED:  Additional History from Old Records Summarized (2): Reviewed office notes from Dr. Valiente, Dr. Wall, GI, hospital records for liver  Decision to Obtain Records (1):    Radiology Tests Summarized or Ordered (1): Reviewed ultrasound imaging of the liver, reviewed CT scan of the chest  Labs Reviewed or Ordered (1): Labs reviewed.  Medicine Test Summarized or Ordered (1):    Independent Review of EKG, X-RAY, or RAPID STREP (2 each):      The visit lasted a total of 45 minutes face to face with the patient. Over 50% of the time was spent counseling and educating the patient     CHIEF COMPLAINT:  Chief  Complaint   Patient presents with     Chest Pain     Pain in lower ribs or upper stomach.  Started Jan 2019.  Follow up from Dr. Valiente.       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 Been working since 1990s for [employer] . Bought out so moving on.  Her primary care physician is Dr. Nilam Valiente at [facility] and they have done extensive work-up to try to figure out the etiology of her symptoms.  She was referred to me to see if osteopathic manual therapy would be effective for her.  Had hysterectomy dec 2018. Since that started having a ton of health issues.  Reason for the hysterectomy was large ovarian cyst that was noted incidentally.  See below.  It is lower rib on both sides. It is not constant. It is intermittent and sometimes shifts sides. Sometimes radiates to mid back. Brought it up to Dr. Wall and [place].   Only work up has had was GI ultrasound.  Has not had an upper endoscopy.  In the process of developing other odd symptoms- had some TMJ on left side of face and sinus surgery last year in September from mucous cyst-didn't get better with surgery   Did see PT for TMJ and thought abdominal fascia tight and maybe some scar tissue. Some exercises for rib  Pain.  Not much help. They suggested for tmj some myofascial release for some muscle tension.   Sometimes literally feels like rib bones and sometimes more muscular. No symptoms with breath. Majority is a dull aching. Once in awhile constant pressure deeper ache. Not sharp.  Sometimes takes medication for it. [place] to live with it. Noticed almost daily x 2 years  Was having pain in R hip and pelvic region. MRI nov 2018 and saw large cyst on R ovary. Was having very heavy periods from endometriosis. Total hysterectomy and left left ovary. Took cervix. Hip pain still sporadic.   Had numbness and tingling down leg.  Saw [facility]  neurology whose notes I had reviewed.  MS ruled out.  There was an MRI of the thoracic and lumbar spine.  They saw syrinx-she was sent to neurosurgery for evaluation. MRI repeated this summer and was stable. -It is uncertain whether this could be causing any of her symptoms.  Has hiatal hernia and saw GI. Last 2018 .  They also gave information for inflammatory bowel disease.  Has not had an upper endoscopy.   2001 was having issues with pregnancy had laparoscopic surgery.  There was some abnormality or marbling that was noted during the surgery by the OB/GYN so she was sent to GI and ended up doing liver biopsies at that time which were completely normal and nothing abnormal was seen on imaging.      She does not take anything for pain.  Just lives with her symptoms as more of a nuisance.    She does not take any regular medications and is otherwise healthy.       REVIEW OF SYSTEMS:    Comprehensive review of systems is negative except as noted in the HPI.     PFSH:  Tobacco use and medications reviewed below.  1 of her children has special needs and so she does spend a good amount of time taking care of her and helping her with her things.  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    TOBACCO USE:  Social History     Tobacco Use   Smoking Status Never Smoker   Smokeless Tobacco Never Used       VITALS:  Vitals:    09/28/20 1448   BP: 136/83   Pulse: 91   Resp: 18   Temp: 98.3  F (36.8  C)   TempSrc: Oral   SpO2: 100%   Weight: 175 lb (79.4 kg)     Wt Readings from Last 3 Encounters:   09/28/20 175 lb (79.4 kg)   09/04/20 173 lb 4 oz (78.6 kg)   01/09/20 168 lb (76.2 kg)       PHYSICAL EXAM:  Constitutional:  Reveals a 45 y.o. female in NAD.  Vitals:  Per nursing notes.  Neck:  Supple, thyroid not palpable.  Cardiac:  Regular rate and rhythm without murmurs, rubs, or gallops. Carotids without bruits. Legs without edema. Palpation of the radial pulse regular.  Lungs: Clear.  Respiratory effort  normal.  Skin:   Without rash, bruise, or palpable lesions.  Rheumatologic: Normal joints and nails of the hands.  Neurologic:  Cranial nerves II-XII intact.     Psychiatric:  Mood appropriate, memory intact.   OMT-hips with anterior inferior innominate on the right  L5-S1 rotated left side bent right, thoracic spine rotated left side bent left, inhalation dysfunction on the left, exhalation dysfunction on the right  Ribs do not deflate well with exhalation.  Ribs 2 and 3 lockdown on the left, ribs 5 9 and 10 locked up on the right, thoracic inlet rotated right side bent right, tight subscapular muscles OA rotated left side bent right.  Tight abdominal fascia    Procedure 7 body regions treated with osteopathic manual therapy-of OA, cervical spine, thoracic spine, ribs, thoracic, lumbar, sacral and pelvis using combination of myofascial release, stills indirect technique, muscle energy.  Verbal consent was first obtained and expectations given to patient.  Patient tolerated procedure well and had some improvement of somatic dysfunction and range of motion following the procedure.      MEDICATIONS:  Current Outpatient Medications   Medication Sig Dispense Refill     azelastine (ASTELIN) 137 mcg (0.1 %) nasal spray Apply 1 spray into each nostril 2 (two) times a day.       cholecalciferol, vitamin D3, (VITAMIN D3) 1,000 unit capsule Take 1,000 Units by mouth daily.       clobetasoL (TEMOVATE) 0.05 % Gel Dry effected area with guaze. Apply small amount three times a day. Avoid food or drink for 30 minutes after application. 30 each 1     fluticasone propionate (FLONASE) 50 mcg/actuation nasal spray 2 sprays into each nostril daily.       No current facility-administered medications for this visit.

## 2021-06-12 NOTE — TELEPHONE ENCOUNTER
kaleigh can you please send the referral to [facility] in [place] please.  Patient will schedule appointment

## 2021-06-12 NOTE — TELEPHONE ENCOUNTER
Mandi farris for some reason they told pt they didn't get referral. Get we follow up with [facility] to be sure?

## 2021-06-13 NOTE — PROGRESS NOTES
Arcadio has reviewed her CT results by [facility].  No abnormalities to explain abdominal pain.   3cm left adnexal low dense mass. No follow up needed assuming she is premenopausal.   Nilam Valiente, DO

## 2021-06-13 NOTE — PROGRESS NOTES
[facility] Note    Name: Arcadio Barber  : 1975   MRN: [id number]    Arcadio Barber is a 45 y.o. female presenting to discuss the following:     CC:   Chief Complaint   Patient presents with     Bowel changes     Chest Pain       HPI:  BOWEL CHANGES: Over the last few months. Previously, had a daily BM. They have changed. More shreddy, mushy texture, has multiple pieces when she goes. Color is yellow/brown. Not having any stomach pain or cramps. No blood in her stool. Not loose like diarrhea.     No real changes in diet.   Oldest sibling just had first colonoscopy (61 yo), was having bowel changes, had a suspicious polyp. Was told family members should consider screening.     PERSISTENT CHEST WALL PAIN: Saw Dr. Ibarra, was referred to PT, has had 3 rounds of PT, then due to illnesses, wasn't able to continue. Has a friend who is a thermographer. Had a scan. Showed costochondritis on the right and some other findings.     ROS:   No fevers, no night sweats, no unexplained weight loss.     PMH:   Patient Active Problem List   Diagnosis     Migraine Headache     Generalized Anxiety Disorder     Salivary Secretion Disturbance: Xerostomia     Numbness (Hypesthesia)     Endometriosis     Bunion     Fatigue     Pes Planus     Allergic Rhinitis       Past Medical History:   Diagnosis Date     Arrhythmia     PVC's on holter     Breast cyst      GERD (gastroesophageal reflux disease)      Iron Deficiency Anemia Secondary To Chronic Blood Loss     Created by Conversion      PONV (postoperative nausea and vomiting)        PSH:   Past Surgical History:   Procedure Laterality Date     BREAST EXCISIONAL BIOPSY Right      DIAGNOSTIC LAPAROSCOPY       EXPLORATORY LAPAROTOMY      remove endometriosis     HYSTERECTOMY  2018     LAPAROSCOPIC TOTAL HYSTERECTOMY N/A 12/10/2018    Procedure: ROBOTIC TOTAL LAPAROSCOPIC HYSTERECTOMY BILATERAL SALPINGECTOMY RIGHT OOPHORECTOMY CYSTOSCOPY;  Surgeon: Davis Bhatti  "MD CHAN;  Location: [facility] OR;  Service: Gynecology     OOPHORECTOMY  2018    One removed         MEDICATIONS:   Current Outpatient Medications on File Prior to Visit   Medication Sig Dispense Refill     cholecalciferol, vitamin D3, (VITAMIN D3) 1,000 unit capsule Take 1,000 Units by mouth daily.       fluticasone propionate (FLONASE) 50 mcg/actuation nasal spray 2 sprays into each nostril daily.       azelastine (ASTELIN) 137 mcg (0.1 %) nasal spray Apply 1 spray into each nostril 2 (two) times a day.       clobetasoL (TEMOVATE) 0.05 % Gel Dry effected area with guaze. Apply small amount three times a day. Avoid food or drink for 30 minutes after application. 30 each 1     No current facility-administered medications on file prior to visit.        ALLERGIES:  Allergies   Allergen Reactions     Codeine Nausea And Vomiting     Protonix [Pantoprazole]      Penicillins Rash     Sulfa (Sulfonamide Antibiotics) Rash       FAMHx:  Family History   Problem Relation Age of Onset     Sudden death Mother      Cancer Father      Cancer Sister        SOCIAL HISTORY:   Social History     Tobacco Use     Smoking status: Never Smoker     Smokeless tobacco: Never Used   Substance Use Topics     Alcohol use: Yes     Comment: occasional     Drug use: No       PHYSICAL EXAM:   /82   Pulse 90   Ht 5' 7\" (1.702 m)   Wt 175 lb 6 oz (79.5 kg)   LMP 12/02/2018 (Exact Date)   BMI 27.47 kg/m     GENERAL: Arcadio is a pleasant, well appearing female, in no acute distress.   HEENT: Sclera white, no cervical lymphadenopathy, no thyromegaly. No carotid bruits present.   HEART: Regular rate and rhythm, no murmurs.   LUNGS: Clear to auscultation bilaterally, unlabored.   ABDOMEN: Soft, mildly tender to palpation in epigastric region. No guarding, no rigidity, no rebound tenderness.   MSK: Slight pain with compression of rib cage. No palpable crepitus or deformities.     ASSESSMENT & PLAN:   Arcadio Barber is a 45 y.o. female " presenting today for persistent chest wall/epigastric pain and new onset bowel changes.    1. Altered bowel function  - Ambulatory referral for Colonoscopy    Reviewed several possibilities for symptoms including dietary changes, IBS symptoms, less likely malignancy or inflammatory bowel disease.  Denies any recent changes in her diet.  Does endorse brother having an unusual finding on colonoscopy with recommendation for family members to proceed with colonoscopy.  No red flag symptoms present such as blood in stool, unexplained weight loss, night sweats or chills.     I discussed several options with Neelima.    A) we could focus on dietary changes, increased fiber, increased water intake   B) we could proceed with GI referral to further discuss symptoms and consider colonoscopy   C) due to family history of abnormal colonoscopy and bowel changes, we could proceed with colonoscopy    We opted to proceed with colonoscopy. Referral placed.     2. Chronic upper abdominal pain  3. Musculoskeletal pain  - CT Abdomen Pelvis With Oral With IV Contrast; Future  - cyclobenzaprine (FLEXERIL) 5 MG tablet; Take 1 tablet (5 mg total) by mouth 2 (two) times a day as needed for muscle spasms.  Dispense: 60 tablet; Refill: 0    Arcadio continues to have pain bilateral upper quadrants. This has been extensively evaluated without etiology identified. Symptoms most consistent with musculoskeletal etiology (muscle spasm vs costochondritis). Symptoms have actually worsened with trial of soft tissue treatments (OMT, PT).     We will trial muscle relaxer to see if this helps with symptoms. We will also proceed with abdominal CT scan as this has not yet been completed. With completion of CT and colonoscopy, if testing is normal we will have extensively evaluated source of pain and ruled out pathology. Will shift focus to symptom management with reassurance that extensive evaluation had been completed.     4. Influenza vaccination  declined    RTC: 6 months - annual physical exam, follow up pre-hypertension. Blood pressure is under treatment threshold today.    Nilam Valiente, DO

## 2021-06-19 NOTE — LETTER
Letter by Eyad Thornton MD at      Author: Eyad Thornton MD Service: -- Author Type: --    Filed:  Encounter Date: 11/21/2019 Status: Signed           [facility]    [facility]  [address] [place] [address]  [phone]    [facility]  [address], [address]  [place] [address]  [phone]    11/25/19    Nilam Valiente,   [address] [facility] [address]    Patient: Arcadio Barber  MR Number: [id number]  YOB: 1975  Date of Visit: 11/21/2019          Thank you for referring Arcadio Barber to me for evaluation.  Please see attached visit note.  If you have questions, please do not hesitate to contact me.      Sincerely,    Eyad Thornton MD  [facility]  [phone]  [email or web address]                    [email or web address]              Assessment:    Allergic rhinitis with specific sensitivity to mold.  Specifically Alternaria which is generally an outdoor mold.    Plan:    Avoidance measures regarding mold reviewed with patient.  Discussed possible indoor triggers as well as managing outdoor mold.  Medication: Astelin 2 sprays each nostril twice daily can be used.    Follow-up as needed.  Recommend 6 weeks if not improved.  ____________________________________________________________________________     Patient comes in today for allergy evaluation.  She has had a history of allergies.  Recently she developed a sinus infection January 2019.  She had several antibiotic courses of.  After that she was having symptoms of eye pressure on the left side.  Was uncomfortable.  She saw an ophthalmologist who prescribed allergy eyedrops which did not seem to help.  In March she saw a dentist because she was having pain in her left side of her upper teeth.  They reported cavities and a crown was placed.  They did an x-ray at that time and noticed a sinus infection.  The dentist prescribed a azithromycin  and a 5-day course of prednisone.  Her symptoms did not improve however.  She continued to have sinus pressure and was seen by ENT.  They did empiric therapy for sinus infection.  Ceftin was prescribed for 3 weeks.  After completing that she was no better and a CT scan was done which showed left sided sinusitis.  On September 24 she had sinus surgery.  They found a walled off sinusitis infection.  She also had a deviated nasal septum that was repaired.  However since that time she still continues to have some symptoms although improved.  She is here for allergy evaluation to see if that may be playing a role.    Review of symptoms:  As above, otherwise negative    Past medical history: Gastroesophageal reflux.  Migraine headaches.  History of anxiety disorder.    Allergies: No known allergies to latex, foods or hymenoptera venom.  Penicillin, sulfa antibiotics causing rash.  Nausea and vomiting with codeine.    Family history: Daughter with allergies and asthma.    Social history: Currently lives in house with forced air heat and central air conditioning.  There is a basement present.  They do see some mold at the windows.  No water leak still.  They have had a dog in the home for 8 years.  No cigarette smoking history.  She works as a registered nurse.    Medications: reviewed in chart    Physical Exam:  General:  Alert and in no apparent distress.  Eyes:  Sclera clear.  Ears: TMs translucent grey with bony landmarks visible. Nose: Pale, boggy mucosal membranes.  Throat: Pink, moist.  No lesions.  Neck: Supple.  No lymphadenopathy.  Lungs: CTA.  CV: Regular rate and rhythm. Extremities: Well perfused.  No clubbing or cyanosis. Skin: No rash    Last Percutaneous Allergy Test Results  Trees  Bethel, White  1:20 H  (W/F in mm): 0-0 (11/21/19 1619)  Birch Mix 1:20 H (W/F in mm): 0-0 (11/21/19 1619)  Nicholas, Common 1:20 H (W/F in mm): 0-0 (11/21/19 1619)  Elm, American 1:20 H (W/F in mm): 0-0 (11/21/19 1619)  Eaton,  Shagbark 1:20 H (W/F in mm): 0-0 (11/21/19 1619)  Maple, Hard/Sugar 1:20 H (W/F in mm): 0-0 (11/21/19 1619)  Barnegat Mix 1:20 H (W/F in mm): 0-0 (11/21/19 1619)  Oak, Red 1:20 H (W/F in mm): 0-0 (11/21/19 1619)  Arapaho, American 1:20 H (W/F in mm): 0-0 (11/21/19 1619)  Normanna Tree 1:20 H (W/F in mm): 0-0 (11/21/19 1619)  Dust Mites  D. Pteronyssinus Mite 30,000 AU/ML H (W/F in mm): 0-0 (11/21/19 1619)  D. Farinae Mite 30,000 AU/ML H (W/F in mm: 0-0 (11/21/19 1619)  Grasses  Grass Mix #4 10,000 BAU/ML H: 0-0 (11/21/19 1619)  Sunil Grass 1:20 H (W/F in mm): 0-0 (11/21/19 1619)  Cockroach  Cockroach Mix 1:10 H (W/F in mm): 0-0 (11/21/19 1619)  Molds/Fungi  Alternaria Tenuis 1:10 H (W/F in mm): 9-15 (11/21/19 1619)  Aspergillus Fumigatus 1:10 H (W/F in mm): 0-0 (11/21/19 1619)  Homodendrum Cladosporioides 1:10 H (W/F in mm): 0-0 (11/21/19 1619)  Penicillin Notatum 1:10 H (W/F in mm): 0-0 (11/21/19 1619)  Epicoccum 1:10 H (W/F in mm): 0-0 (11/21/19 1619)  Weeds  Ragweed, Short 1:20 H (W/F in mm): 0-0 (11/21/19 1619)  Dock, [place] 1:20 H (W/F in mm): 0-0 (11/21/19 1619)  Lamb's Quarter 1:20 H (W/F in mm): 0-0 (11/21/19 1619)  Pigweed, Rough Red Root 1:20 H  (W/F in mm): 0-0 (11/21/19 1619)  Plantain, English 1:20 H  (W/F in mm): 0-0 (11/21/19 1619)  Sagebrush, Mugwort 1:20 H  (W/F in mm): 0-0 (11/21/19 1619)  Animal  Cat 10,000 BAU/ML H (W/F in mm): 0-0 (11/21/19 1619)  Dog 1:10 H (W/F in mm): 0-0 (11/21/19 1619)  Controls  Device Type: QUINTIP (11/21/19 1619)  Neg. control: 50% Glycerine/Saline H (W/F in mm): 0-0 (11/21/19 1619)  Pos. control: Histamine 6mg/ML (W/F in mms): 5-15 (11/21/19 1619)     Patient seen for Dr. Nilam Valiente for evaluation of allergies

## 2021-06-19 NOTE — PROGRESS NOTES
"[facility] Note    Patient Name: Arcadio Barber  Patient Age: 42 y.o.  YOB: 1975  MRN: [id number]    Date of visit: 7/26/2018    Patient Active Problem List   Diagnosis     Migraine Headache     Generalized Anxiety Disorder     Salivary Secretion Disturbance: Xerostomia     Numbness (Hypesthesia)     Iron Deficiency Anemia Secondary To Chronic Blood Loss     Anemia     Endometriosis     Menorrhagia     Iron Deficiency     Bunion     Fatigue     Pes Planus     Allergic Rhinitis     Social History     Social History Narrative     Family History   Problem Relation Age of Onset     No Medical Problems Mother      Cancer Father      Cancer Sister      No Medical Problems Daughter      No Medical Problems Maternal Grandmother      No Medical Problems Maternal Grandfather      No Medical Problems Paternal Grandmother      No Medical Problems Paternal Grandfather      No Medical Problems Maternal Aunt      No Medical Problems Paternal Aunt      BRCA 1/2 Neg Hx      Breast cancer Neg Hx      Colon cancer Neg Hx      Endometrial cancer Neg Hx      Ovarian cancer Neg Hx      Outpatient Encounter Prescriptions as of 7/26/2018   Medication Sig Dispense Refill     [DISCONTINUED] triamcinolone (KENALOG) 0.1 % ointment Apply topically 2 (two) times a day. 30 g 0     No facility-administered encounter medications on file as of 7/26/2018.        Chief Complaint:   Chief Complaint   Patient presents with     Cough     Anemia       /74  Pulse 93  Temp 98.6  F (37  C) (Oral)   Resp 12  Ht 5' 7.5\" (1.715 m)  Wt 164 lb (74.4 kg)  LMP 07/06/2018  SpO2 100%  BMI 25.31 kg/m2  HPI:   Continuing to get heavier periods.  Last 3, significant clotting. Last 5-6 days, happening every 25 days.  8 pads x 3 days.  Craving ice.  Some hair loss.  Trying to lose weight - limiting portions and exercising more.  Unable to lose.  Some insomnia, hx anx.  Daughter asperger's, more stress since winter - anx began then.  " "    Has had a cough is nonproductive it is off and on for the last week and half.  No chest pain shortness of breath or leg swelling.    ROS: Pertinent ros findings in hpi, all other systems negative.    Objective/Physical Exam:     /74  Pulse 93  Temp 98.6  F (37  C) (Oral)   Resp 12  Ht 5' 7.5\" (1.715 m)  Wt 164 lb (74.4 kg)  LMP 07/06/2018  SpO2 100%  BMI 25.31 kg/m2    Heart: Regular rhythm, no rubs or gallops.  Normal rate: y  Murmur: n    Lungs:   Clear to auscultation bilaterally: y  Wheeze: n  Rhonchi: n  Crackles: n  Area of decreased breath sounds: n  Labored breathing: n      Left eye:  Conjunctival erythema: n  Purulent drainage: n  Proptosis:n    Right eye:  Conjunctival erythema: n  Purulent drainage: n  Proptosis: n    Right tympanic membrane:   color: pale  ossicles visualized: y  umbo distorted: n  cone of light distorted: n  Air/fluid levels: n  Drainage:n  Canal: not edematous no discharge  Pain with external ear manipulation: n  Foreign body: n    Left tympanic membrane:   color: pale  ossicles visualized: y  umbo distorted: n  cone of light distorted: n  Air/fluid levels: n  Drainage: n  Canal: not edematous no discharge  Pain with external ear manipulation: n  Foreign body: n    No auricular protrusion or erythema/swelling over mastoid area bilaterally.    No white patches that scrape off, no deviation of uvula. no ulcerations noted.    No torticollis, neck stiffness, drooling, muffled/hot potato voice, submandibular swelling, trismus or stridor.    Pharynx:   Erythema: n  Pus pockets: n  Tender cervical lymphadenopathy: n    Well appearing: y  Moist mucous membranes: y    MSK: no muscle or joint swelling  Neuro: no dysarthria or gross asymmetry  Psych: full affect, oriented x 3  Hematologic: no petechiae or purpura    Skin: No rash.   No edema    Assessment/Plan:  No results found for this or any previous visit (from the past 24 hour(s)).      ICD-10-CM    1. History of anemia " Z86.2 Thyroid Stimulating Hormone (TSH)     Vitamin D, Total (25-Hydroxy)     HM1(CBC and Differential)     Ferritin     LTD-Iron and Transferrin Iron Saturation     HM1 (CBC with Diff)     Iron and Transferrin Iron Binding Capacity   2. Hair loss L65.9 Thyroid Stimulating Hormone (TSH)     Vitamin D, Total (25-Hydroxy)     HM1(CBC and Differential)     Ferritin     LTD-Iron and Transferrin Iron Saturation     HM1 (CBC with Diff)     Iron and Transferrin Iron Binding Capacity   3. Anxiety F41.9    4. Cough R05        I did give some recommendations for weight loss.  I am checking again for iron stores, I recommended that she follow-up with her gynecologist as she may benefit from a procedure.  We did discuss counseling at length, I did recommend that she see a counselor in the near future.  She will check and see what medication she was on previously and let me know, I would recommend a medication at this point as well, we did discuss that it can take 2-6 weeks to start working.  If the cough continues to persist over the next couple weeks I recommend reevaluation.  Pt. Declined pregnancy test.  I do not suspect pregnancy.    Patient Instructions   Plant Based Diet Handout    Eat abundant vegetables.    Only eat whole grains.    2-4 fruits/day.    Enjoy at least 2 servings of legumes (beans) or tofu daily.      Avoid animal products such as dairy, eggs and meat. (Replace these with 1,000mcg vitamin B12 and a calcium/vitamin D supplement such as Caltrate+D3 daily.)    Avoid processed foods, sugars and oils.    Cook your own food as much as possible.    Keep a food diary and ask someone else to diet with you.    Drink 8 glasses of water a day.    [place] [employer] Documentary - watch online.    Seek out a Jehovah's witness counselor    Follow-up with OB soon.      Return if cough doesn't improve over next 2 weeks.     Call and let me know name of previous anxiety medication.      Counseled patient regarding treatments,  treatment options, risks and benefits and diagnosis.  The patient was interactive, attentive, verbalized understanding, and we discussed plan.     Kirk Orona MD

## 2021-06-20 NOTE — PROGRESS NOTES
"[facility] Note    Patient Name: Arcadio Barber  Patient Age: 42 y.o.  YOB: 1975  MRN: [id number]    Date of visit: 8/27/2018    Patient Active Problem List   Diagnosis     Migraine Headache     Generalized Anxiety Disorder     Salivary Secretion Disturbance: Xerostomia     Numbness (Hypesthesia)     Iron Deficiency Anemia Secondary To Chronic Blood Loss     Anemia     Endometriosis     Menorrhagia     Iron Deficiency     Bunion     Fatigue     Pes Planus     Allergic Rhinitis     Social History     Social History Narrative     Family History   Problem Relation Age of Onset     No Medical Problems Mother      Cancer Father      Cancer Sister      No Medical Problems Daughter      No Medical Problems Maternal Grandmother      No Medical Problems Maternal Grandfather      No Medical Problems Paternal Grandmother      No Medical Problems Paternal Grandfather      No Medical Problems Maternal Aunt      No Medical Problems Paternal Aunt      BRCA 1/2 Neg Hx      Breast cancer Neg Hx      Colon cancer Neg Hx      Endometrial cancer Neg Hx      Ovarian cancer Neg Hx      Outpatient Encounter Prescriptions as of 8/27/2018   Medication Sig Dispense Refill     ascorbic acid, vitamin C, (VITAMIN C) 250 MG tablet 1 tab two times a day every other day with iron. 90 tablet 3     cholecalciferol, vitamin D3, (VITAMIN D3) 1,000 unit capsule Take 1,000 Units by mouth daily.       ferrous sulfate 325 (65 FE) MG tablet 1 tab two times a day every other day with vitamin c. 60 tablet 3     azithromycin (ZITHROMAX Z-CATRACHITA) 250 MG tablet Take 2 tablets (500 mg) on  Day 1,  followed by 1 tablet (250 mg) once daily on Days 2 through 5. 6 tablet 0     No facility-administered encounter medications on file as of 8/27/2018.        Chief Complaint:   Chief Complaint   Patient presents with     Follow-up     pneumonia       /78  Pulse 95  Temp 97.9  F (36.6  C) (Oral)   Resp 12  Ht 5' 7.5\" (1.715 m)  Wt 165 lb 9 " "oz (75.1 kg)  LMP 08/01/2018  SpO2 100%  BMI 25.55 kg/m2  HPI:   Cough started mid July, went to [facility] mid august - neg cxr, dx'd with \"walking pna\", used albuterol. Didn't help.  Started doxycyline 8/22. Caused heartburn and nausea, last dose Saturday.  Heartburn better.  Cough is persisting.  Sometimes mildly productive, other times dry. Comes and goes.  Still having some chest tightness and some pain across upper back. Has had some post nasal drip. Using allegra.   No fever.    ROS: Pertinent ros findings in hpi, all other systems negative.    Objective/Physical Exam:     /78  Pulse 95  Temp 97.9  F (36.6  C) (Oral)   Resp 12  Ht 5' 7.5\" (1.715 m)  Wt 165 lb 9 oz (75.1 kg)  LMP 08/01/2018  SpO2 100%  BMI 25.55 kg/m2    Gen: NAD, appears age  Skin: warm, dry  HENT: normocephalic atraumatic, MMM  Eyes: non-icteric, no proptosis  CV: NRRR no m/r/g, no peripheral edema  Resp: CTAB no w/r/r, normal respiratory effort  Abd: non-distended, soft  Hematologic: No petechiae or purpura  MSK: no muscle or joint swelling  Neuro: no dysarthria or gross asymmetry  Psych: Cooperative, full affect      Assessment/Plan:  No results found for this or any previous visit (from the past 24 hour(s)).  Medications Ordered   Medications     azithromycin (ZITHROMAX Z-CATRACHITA) 250 MG tablet     Sig: Take 2 tablets (500 mg) on  Day 1,  followed by 1 tablet (250 mg) once daily on Days 2 through 5.     Dispense:  6 tablet     Refill:  0       ICD-10-CM    1. Cough R05 QTF-Mycobacterium tuberculosis by QuantiFERON-TB Gold Plus     XR Chest 2 Views       Persistent cough, likely postnasal drip.  Could be prolonged bronchitis.  She was not able to finish her course of doxycycline given at the emergency room, we discussed switching to azithromycin.  However, we also discussed that if it is bronchitis azithromycin may not help.  We are repeating a chest x-ray and checking for tuberculosis.  I recommended that if the above " therapies are not helping over the next couple weeks that she let us know.    Patient Instructions   May use sinus rinse (i.e. neti) with sterile water (bottled or tap water boiled x 10 minutes and cooled to room temperature) 2-3x daily for nasal congestion  May use flonase 1-2 sprays each nostril daily alone or after sinus rinse. Have head look down and direct spray upward and slightly lateral of midline. Discontinue if nosebleeds.   Alternatively, may use 4 sprays flonase in twice daily rinse or 6 sprays in once daily rinse.       If not improving over next 2 weeks, let us know      Counseled patient regarding treatments, treatment options, risks and benefits and diagnosis.  The patient was interactive, attentive, verbalized understanding, and we discussed plan.     Kirk Orona MD

## 2021-06-20 NOTE — PROGRESS NOTES
RESPIRATORY CARE NOTE     Patient Name: Arcadio Barber  Today's Date: 9/19/2018     Complete PFT done. Pt performed tests with good effort. Test results meet ATS criteria. Results scanned into epic. Pt left in no distress.       Neelima Miller

## 2021-06-20 NOTE — PROGRESS NOTES
[facility] Clinic Note    Patient Name: Arcadio Barber  Patient Age: 42 y.o.  YOB: 1975  MRN: [id number]    Date of visit: 9/10/2018    Patient Active Problem List   Diagnosis     Migraine Headache     Generalized Anxiety Disorder     Salivary Secretion Disturbance: Xerostomia     Numbness (Hypesthesia)     Iron Deficiency Anemia Secondary To Chronic Blood Loss     Anemia     Endometriosis     Menorrhagia     Iron Deficiency     Bunion     Fatigue     Pes Planus     Allergic Rhinitis     Social History     Social History Narrative     Family History   Problem Relation Age of Onset     No Medical Problems Mother      Cancer Father      Cancer Sister      No Medical Problems Daughter      No Medical Problems Maternal Grandmother      No Medical Problems Maternal Grandfather      No Medical Problems Paternal Grandmother      No Medical Problems Paternal Grandfather      No Medical Problems Maternal Aunt      No Medical Problems Paternal Aunt      BRCA 1/2 Neg Hx      Breast cancer Neg Hx      Colon cancer Neg Hx      Endometrial cancer Neg Hx      Ovarian cancer Neg Hx      Outpatient Encounter Prescriptions as of 9/10/2018   Medication Sig Dispense Refill     ascorbic acid, vitamin C, (VITAMIN C) 250 MG tablet 1 tab two times a day every other day with iron. 90 tablet 3     capsaicin (ZOSTRIX) 0.025 % cream Apply topically 4 (four) times a day. If develop rash or other reaction, stop. 60 g 1     cholecalciferol, vitamin D3, (VITAMIN D3) 1,000 unit capsule Take 1,000 Units by mouth daily.       ferrous sulfate 325 (65 FE) MG tablet 1 tab two times a day every other day with vitamin c. 60 tablet 3     [DISCONTINUED] OMEPRAZOLE ORAL Take 20 mg by mouth daily.       ipratropium (ATROVENT) 0.02 % nebulizer solution Take 2.5 mL (0.5 mg total) by nebulization 4 (four) times a day as needed (cough). 140 mL 0     ranitidine (ZANTAC) 150 MG tablet Take 1 tablet (150 mg total) by mouth 2 (two) times a day. 60  "tablet 1     No facility-administered encounter medications on file as of 9/10/2018.        Chief Complaint:   Chief Complaint   Patient presents with     Palpitations       /76  Pulse 72  Temp 98.4  F (36.9  C) (Oral)   Resp 12  Ht 5' 7.5\" (1.715 m)  Wt 166 lb (75.3 kg)  LMP 08/27/2018  BMI 25.62 kg/m2  HPI:   Palpitations. Didn't start atrovent.  Cough is improving.  Started omeprazole on Thursday.  Has had previously without sx.  Has had some heartburn occasionally since doxycycline.  Taking her iron.  No presyncope.  Feels anxious a few times a minute.  Has been active.  Have lasted since Saturday.  Has not had tachycardia.  Had a short pressing sensation epigastric x 2 minutes this morning but otherwise no chest pain.  No shob.     ROS: Pertinent ros findings in hpi, all other systems negative.    Objective/Physical Exam:     /76  Pulse 72  Temp 98.4  F (36.9  C) (Oral)   Resp 12  Ht 5' 7.5\" (1.715 m)  Wt 166 lb (75.3 kg)  LMP 08/27/2018  BMI 25.62 kg/m2    Gen: NAD, appears age  Skin: warm, dry  HENT: normocephalic atraumatic, MMM  Eyes: non-icteric, no proptosis  CV: NRRR no m/r/g, no peripheral edema there are some skipped beats.  Resp: CTAB no w/r/r, normal respiratory effort  Abd: non-distended, soft  Hematologic: No petechiae or purpura  MSK: no muscle or joint swelling  Neuro: no dysarthria or gross asymmetry  Psych: Cooperative, full affect        Assessment/Plan:  Recent Results (from the past 24 hour(s))   Electrocardiogram Perform - Clinic   Result Value Ref Range    SYSTOLIC BLOOD PRESSURE  mmHg    DIASTOLIC BLOOD PRESSURE  mmHg    VENTRICULAR RATE 83 BPM    ATRIAL RATE 83 BPM    P-R INTERVAL 148 ms    QRS DURATION 74 ms    Q-T INTERVAL 362 ms    QTC CALCULATION (BEZET) 425 ms    P Axis 53 degrees    R AXIS 55 degrees    T AXIS 47 degrees    MUSE DIAGNOSIS       Sinus rhythm with occasional Premature ventricular complexes  Otherwise normal ECG  When compared with ECG of " 18-APR-2017 11:11,  Premature ventricular complexes are now Present       Medications Ordered   Medications     ranitidine (ZANTAC) 150 MG tablet     Sig: Take 1 tablet (150 mg total) by mouth 2 (two) times a day.     Dispense:  60 tablet     Refill:  1       ICD-10-CM    1. Palpitations R00.2 Electrocardiogram Perform - Clinic     Stress Test Graded     Ambulatory referral to Cardiology     Echo Complete     Holter Monitor     Magnesium     Comprehensive Metabolic Panel     HM1(CBC and Differential)     Thyroid Stimulating Hormone (TSH)     HM1 (CBC with Diff)       She is not using much caffeine she is not on any illicit drugs.  I recommend she stay off caffeine at this point, testing as above.  I recommend that if her symptoms are worsening that she go to the emergency room or let us know.  She is comfortable with this plan.    Patient Instructions   Go to ER if feeling like you will faint or chest pain worsening or worsening palpitations or any other concern.      Counseled patient regarding treatments, treatment options, risks and benefits and diagnosis.  The patient was interactive, attentive, verbalized understanding, and we discussed plan.     Kirk Orona MD

## 2021-06-20 NOTE — PROGRESS NOTES
Assessment and Plan:Arcadio Barber is a 43 y.o. with a past medical history significant for asthma and severe PNA in childhood, current hiatal hernia, chronic GERD and seasonal allergies with sinus congestion, who presents to clinic today for a cough productive for clear sputum that started in about mid July.  The symptoms have already improved significantly since she was started back on antacids couple of weeks ago.  Her seasonal allergies have also abated in recent weeks.  Most likely etiologies are reflux aspiration and postnasal drip.  The patient has had to elevate her head of bed in the past during her 20s to control her GERD, however is currently sleeping with her back flat.  She experienced no improvement in the cough with an albuterol inhaler prescribed by her primary making asthma unlikely.  Pulmonary function test performed 9/19/2018 were normal.  She denies symptoms of sleep apnea    1. Hiatal hernia/GERD: Already started on an antacid a couple weeks ago with improvement in his symptoms.  The patient had recently been switched from a PPI to Zantac due to palpitations which were thought to be possibly related, however palpitations have continued to recur.  She is following with cardiology for the palpitations.  Our additional recommendations today are:  -Elevate head of bed  -Avoid food or drink within 2-3 hours of bedtime  -Avoid triggers such as alcohol, caffeine, or spicy foods    2. Chronic sinusitis secondary to seasonal allergies in the late summer and fall: Symptoms have abated with the change in season and recent rain  -Continue over-the-counter antihistamines and nasal decongestants as needed    3. Bilateral back pain in her ribs: May be secondary to muscle strain related to cough   -Not likely due to a pulmonary etiology.  Consider workup for upper back pain if symptoms persist after 2 months of the above therapy and cough resolution.    Follow-up discussed with patient, given that her  symptoms are already resolving we will not schedule a follow-up at this time.  She may follow-up in our clinic as needed if they recur or if new questions or issues arise.          CCx: cough    HPI: Ms. Barber was referred to our clinic for a severe cough productive for clear sputum that started about mid July.  When she was out of town on a trip in August she presented to an urgent care for the cough.  A chest x-ray was performed and she was given a presumptive diagnosis of walking pneumonia.  She was prescribed doxycycline but did not take it until she returned to [place] from [place].  When she started taking it she experienced an increase in her baseline heartburn.  Her primary care provider then switched the antibiotics to Z-Felipe which she completed.  Primary care provider also started Prilosec due to her worsening heartburn and history of hiatal hernia diagnosed by EGD in her 20s.  Patient however subsequently developed palpitations for which she has been following up with cardiology.  Her Prilosec was switched to Zantac however the intermittent rotations have continued.  Since being started on antacids however the patient has noticed that her cough is markedly improved.  She also had an associated sore throat and hoarse voice which have also been improving.  She previously had an elevated bed in her 20s due to the heartburn, however currently her bed is flat.  She was also given a prescription for an albuterol inhaler for the cough by her primary care doctor but it did not help.  Patient reports seasonal allergies primarily in the late summer and fall with congestion and sinusitis for which she takes occasional Flonase and Zyrtec.  Her allergy symptoms have also resolved in recent weeks.  She denies any fevers or chills, shortness of breath, wheezing, or other complaints.    PMH:  Past Medical History:   Diagnosis Date     Breast cyst        PSH:  Past Surgical History:   Procedure Laterality Date      BREAST BIOPSY Right 2003     BREAST CYST EXCISION         SH:  Social History     Social History     Marital status:      Spouse name: N/A     Number of children: N/A     Years of education: N/A     Occupational History     Not on file.     Social History Main Topics     Smoking status: Never Smoker     Smokeless tobacco: Never Used     Alcohol use Not on file     Drug use: Not on file     Sexual activity: Not on file     Other Topics Concern     Not on file     Social History Narrative       Family history:  Family History   Problem Relation Age of Onset     No Medical Problems Mother      Cancer Father      Cancer Sister      No Medical Problems Daughter      No Medical Problems Maternal Grandmother      No Medical Problems Maternal Grandfather      No Medical Problems Paternal Grandmother      No Medical Problems Paternal Grandfather      No Medical Problems Maternal Aunt      No Medical Problems Paternal Aunt      BRCA 1/2 Neg Hx      Breast cancer Neg Hx      Colon cancer Neg Hx      Endometrial cancer Neg Hx      Ovarian cancer Neg Hx      The family history was reviewed and is not pertinent to the chief complaint or HPI.    ROS:  A review of 12 organ systems was performed with pertinent positives and negatives noted in the HPI.    Current Meds:  Current Outpatient Prescriptions   Medication Sig     ascorbic acid, vitamin C, (VITAMIN C) 250 MG tablet 1 tab two times a day every other day with iron.     cholecalciferol, vitamin D3, (VITAMIN D3) 1,000 unit capsule Take 1,000 Units by mouth daily.     ferrous sulfate 325 (65 FE) MG tablet 1 tab two times a day every other day with vitamin c.     ranitidine (ZANTAC) 150 MG tablet Take 1 tablet (150 mg total) by mouth 2 (two) times a day.       Labs:  No results found for this or any previous visit (from the past 72 hour(s)).    I have personally reviewed all imaging and PFT data available pertinent to this visit.      PFTs:  9/19/2018 PFTs were within  "normal limits, no evidence of obstruction or restriction    Physical Exam:  /70  Pulse 100  Resp 8  Ht 5' 7.5\" (1.715 m)  Wt 165 lb (74.8 kg)  SpO2 98%  Breastfeeding? No  BMI 25.46 kg/m2  General - Well nourished  Ears/Mouth - OP pink moist, no thrush  Neck - no cervical lymphadenopathy  Lungs - Clear to ausculation bilaterally, no crackles or wheezes  CVS - regular rhythm with no murmurs, rubs or gallups  Abdomen - soft, NT, ND, NABS  Ext - no cyanosis, clubbing or edema  Skin - no rash  Psychology - alert and oriented, answers appropriate        Electronically signed by:    Arley Payton MD  [facility]monary [facility]  "

## 2021-06-20 NOTE — PROGRESS NOTES
[facility] Clinic Note    Patient Name: Arcadio Barber  Patient Age: 42 y.o.  YOB: 1975  MRN: [id number]    Date of visit: 9/6/2018    Patient Active Problem List   Diagnosis     Migraine Headache     Generalized Anxiety Disorder     Salivary Secretion Disturbance: Xerostomia     Numbness (Hypesthesia)     Iron Deficiency Anemia Secondary To Chronic Blood Loss     Anemia     Endometriosis     Menorrhagia     Iron Deficiency     Bunion     Fatigue     Pes Planus     Allergic Rhinitis     Social History     Social History Narrative     Family History   Problem Relation Age of Onset     No Medical Problems Mother      Cancer Father      Cancer Sister      No Medical Problems Daughter      No Medical Problems Maternal Grandmother      No Medical Problems Maternal Grandfather      No Medical Problems Paternal Grandmother      No Medical Problems Paternal Grandfather      No Medical Problems Maternal Aunt      No Medical Problems Paternal Aunt      BRCA 1/2 Neg Hx      Breast cancer Neg Hx      Colon cancer Neg Hx      Endometrial cancer Neg Hx      Ovarian cancer Neg Hx      Outpatient Encounter Prescriptions as of 9/6/2018   Medication Sig Dispense Refill     ascorbic acid, vitamin C, (VITAMIN C) 250 MG tablet 1 tab two times a day every other day with iron. 90 tablet 3     cholecalciferol, vitamin D3, (VITAMIN D3) 1,000 unit capsule Take 1,000 Units by mouth daily.       ferrous sulfate 325 (65 FE) MG tablet 1 tab two times a day every other day with vitamin c. 60 tablet 3     capsaicin (ZOSTRIX) 0.025 % cream Apply topically 4 (four) times a day. If develop rash or other reaction, stop. 60 g 1     ipratropium (ATROVENT) 0.02 % nebulizer solution Take 2.5 mL (0.5 mg total) by nebulization 4 (four) times a day as needed (cough). 140 mL 0     [DISCONTINUED] azithromycin (ZITHROMAX Z-CATRACHITA) 250 MG tablet Take 2 tablets (500 mg) on  Day 1,  followed by 1 tablet (250 mg) once daily on Days 2 through 5. 6  "tablet 0     No facility-administered encounter medications on file as of 9/6/2018.        Chief Complaint:   Chief Complaint   Patient presents with     Follow-up     walking pneuminia       /70  Pulse 98  Temp 97.9  F (36.6  C) (Oral)   Resp 12  Ht 5' 7.5\" (1.715 m)  Wt 165 lb (74.8 kg)  SpO2 100%  BMI 25.46 kg/m2  HPI:   Still coughing off and on throughout the day. Not keeping her up at night.  Is using flonase and sinus rinses. Cough is often productive - white/clear.  No hemoptysis.  Tried albuterol, didn't help.  Does have heartburn every so often, no antacids currently.  She has had a pruritic area of her left lower back for the last month and a half, no rash, topical steroids are not helping.  It is off and on.    ROS: Pertinent ros findings in hpi, all other systems negative.    Objective/Physical Exam:     /70  Pulse 98  Temp 97.9  F (36.6  C) (Oral)   Resp 12  Ht 5' 7.5\" (1.715 m)  Wt 165 lb (74.8 kg)  SpO2 100%  BMI 25.46 kg/m2    Heart: Regular rhythm, no rubs or gallops.  Normal rate: y  Murmur: n    Lungs:   Clear to auscultation bilaterally: y  Wheeze: n  Rhonchi: n  Crackles: n  Area of decreased breath sounds: n  Labored breathing: n      Left eye:  Conjunctival erythema: n  Purulent drainage: n  Proptosis:n    Right eye:  Conjunctival erythema: n  Purulent drainage: n  Proptosis: n      No auricular protrusion or erythema/swelling over mastoid area bilaterally.    No white patches that scrape off, no deviation of uvula. no ulcerations noted.    No torticollis, neck stiffness, drooling, muffled/hot potato voice, submandibular swelling, trismus or stridor.    Pharynx:   Erythema: n  Pus pockets: n  Tender cervical lymphadenopathy: n    Well appearing: y  Moist mucous membranes: y    MSK: no muscle or joint swelling  Neuro: no dysarthria or gross asymmetry  Psych: full affect, oriented x 3  Hematologic: no petechiae or purpura    Skin: No rash.     Left lower back no rash, " nttp.     Assessment/Plan:  No results found for this or any previous visit (from the past 24 hour(s)).  Medications Ordered   Medications     capsaicin (ZOSTRIX) 0.025 % cream     Sig: Apply topically 4 (four) times a day. If develop rash or other reaction, stop.     Dispense:  60 g     Refill:  1     Any size tube ok.  OTC     ipratropium (ATROVENT) 0.02 % nebulizer solution     Sig: Take 2.5 mL (0.5 mg total) by nebulization 4 (four) times a day as needed (cough).     Dispense:  140 mL     Refill:  0       ICD-10-CM    1. Cough R05 Ambulatory referral to Pulmonology     Nebulizer Supplies (cup, tubing, mask, & filters)   2. Itchy skin L29.9        At this point I am uncertain as to the cause of her continued cough, I would recommend that she try omeprazole in the morning for couple weeks.  I will have her visit with pulmonology.  Recommend capsaicin cream for the pruritic area.    Patient Instructions   May try dextromethorphan for cough and omepraxole 20mg in morning.      Counseled patient regarding treatments, treatment options, risks and benefits and diagnosis.  The patient was interactive, attentive, verbalized understanding, and we discussed plan.     Kirk Orona MD

## 2021-06-20 NOTE — LETTER
Letter by Ninfa Mai at      Author: Ninfa Mai Service: -- Author Type: --    Filed:  Encounter Date: 7/9/2020 Status: (Other)         July 9, 2020       Arcadio VALLES Sunil  [address] [place] [id number]    Dear Arcadio Barber:    We are pleased to provide you with secure, online access to medical information for you and your family within [facility] vitalclip. Per your request, we have expanded your account to allow access to the records of the following family members:              Lulu Barber (privilege ends on 7/8/2021.)       How Do I Log In?  1. In your Internet browser, go to [email or web address]  2. Log into vitalclip using your vitalclip Username and Password.  3. Click Sign In.        How Do I Access a Family Member's Account?  4. Select the account you want to access by clicking the [place] with the appropriate patient's name at the top of your screen.   5. You will see a disclaimer page letting you know that you will be viewing a family member's record. Review the disclaimer and then click Accept Proxy Access Disclaimer to proceed.  6. Once you switch to viewing a family member's record, you can navigate to vitalclip pages the same way you would for yourself. You can return to your own account by clicking the [place] at the top of the screen with your name on it.    7. To customize the colors and names of the linked accounts, you can select Personalize from the Profile dropdown menu at the top of the screen, then click the Edit button to make changes.     Additional Information  If you have questions, visit [email or web address], e-mail [email or web address] or call [phone] to talk to our vitalclip staff. Remember, vitalclip is NOT to be used for urgent needs. For medical emergencies, dial 911.

## 2021-06-21 NOTE — PROGRESS NOTES
Assessment/ Plan     1. Paresthesias  Patient has had a 3-week history of anterior thigh paresthesias with very mild weakness appreciated in the right quadricep.  She has a history of lumbar disc disease and a ruptured disc, last MRI was 2000.  Recommend checking any blood work to rule out any secondary causes.  If these are normal, would recommend MRI of the L-spine.  Advised if she has worsening weakness or bowel or bladder dysfunction, to proceed to the emergency room.  Further follow-up and workup pending results of labs and MRI  - MR Lumbar Spine Without Contrast; Future  - Lyme Antibody Cascade  - Antinuclear Antibody (NELLA) Cascade  - Vitamin B12  - Follicle Stimulating Hormone (FSH)    2. GERD (gastroesophageal reflux disease)  Patient's reflux is not under good control with the Zantac.  I think she may been having some adverse side effects from the Prilosec so we will have her try Prevacid.  - lansoprazole (PREVACID) 30 MG capsule; Take 1 capsule (30 mg total) by mouth daily.  Dispense: 30 capsule; Refill: 1      Subjective:       Arcadio Barber is a 43 y.o. female who presents for numbness and tingling in her legs.  This patient normally sees Dr. Alanis.  She comes in today mainly for a 3-week history of some numbness and tingling across her anterior thighs.  She states is worse on the right than the left, but is definitely in both.  If the tingling sedation.  She not having any pain with it.  Sometimes it will radiate down her anterior shin and into her feet.  She states it is intermittent, not constant.  Sometimes it seems worse when she is lying down.  She has actually had quite a few medical issues going on recently.  She just had a workup for palpitations and saw cardiology.  They Stopped on their own and she wonders if maybe it was related to Prilosec.  She is just taking Zantac now for some reflux but it is really not taking care of symptoms.  She was previously seen for a cough and actually  "followed up with pulmonary.  They thought most of her symptoms were due to reflux.  She had normal pulmonary function testing.  She states that in 2000, she was diagnosed with lumbar disc disease is and saw specialist.  She cannot remember specifically what her symptoms were at that time.  She denies any specific back pain.  She not having any bowel or bladder dysfunction.  She denies any weakness or difficulty walking.  She wonders if this can be related to menopause.  She still having a regular menses.  She is following with OB/GYN and they are planning on doing a D&C with an ablation for heavy menses.  She has had some blood drawn over the last couple of months due to all these medical issues.  Everything has been fairly normal.  She was mildly anemic with mildly decreased iron and vitamin D.  She is taking supplements for both.  She denies any other focal neurologic symptoms.  Is not having any difficulty with gait or balance.  No difficulty with speech or swallowing.    Relevant past medical, family, surgical, and social history reviewed with patient, unless noted in HPI, not pertinent for this visit.    Review of Systems   A 12 point comprehensive review of systems was negative except as noted.      Current Outpatient Prescriptions   Medication Sig Dispense Refill     ascorbic acid, vitamin C, (VITAMIN C) 250 MG tablet 1 tab two times a day every other day with iron. 90 tablet 3     cholecalciferol, vitamin D3, (VITAMIN D3) 1,000 unit capsule Take 1,000 Units by mouth daily.       ferrous sulfate 325 (65 FE) MG tablet 1 tab two times a day every other day with vitamin c. 60 tablet 3     ranitidine (ZANTAC) 150 MG tablet Take 150 mg by mouth daily.       lansoprazole (PREVACID) 30 MG capsule Take 1 capsule (30 mg total) by mouth daily. 30 capsule 1     No current facility-administered medications for this visit.        Objective:      /64  Pulse 88  Temp 98.9  F (37.2  C) (Oral)   Resp 16  Ht 5' 7.5\" " (1.715 m)  Wt 164 lb (74.4 kg)  LMP 10/17/2018  BMI 25.31 kg/m2      General appearance: alert, appears stated age and cooperative  Head: Normocephalic, without obvious abnormality, atraumatic  Eyes: conjunctivae/corneas clear. PERRL, EOM's intact   Back: symmetric, no curvature. ROM normal. No CVA tenderness. No tenderness of the l spine. Negative straight leg raise  Lungs: clear to auscultation bilaterally  Heart: regular rate and rhythm, S1, S2 normal, no murmur, click, rub or gallop  Abdomen: soft, non-tender; bowel sounds normal; no masses,  no organomegaly  Extremities: extremities normal, atraumatic, no cyanosis or edema  Neurologic: Alert and oriented X 3, Normal coordination and gait.  Reflexes lower ext are 2+ and equal.  Strength is 5/5 lower ext but subtle weakness ext at knee on the right.         No results found for this or any previous visit (from the past 168 hour(s)).       This note has been dictated using voice recognition software. Any grammatical or context distortions are unintentional and inherent to the software

## 2021-06-22 NOTE — TELEPHONE ENCOUNTER
Refill Approved    Rx renewed per Medication Renewal Policy. Medication was last renewed on 10/30/18.    Juanis Tam, [facility] Triage/Med Refill 1/9/2019     Requested Prescriptions   Pending Prescriptions Disp Refills     lansoprazole (PREVACID) 30 MG capsule [Pharmacy Med Name: LANSOPRAZOLE 30MG DR CAPSULES] 30 capsule 0     Sig: TAKE 1 CAPSULE(30 MG) BY MOUTH DAILY    GI Medications Refill Protocol Passed - 1/8/2019  7:52 AM       Passed - PCP or prescribing provider visit in last 12 or next 3 months.    Last office visit with prescriber/PCP: 11/30/2018 Lanny Wall MD OR same dept: 11/30/2018 Lanny Wall MD OR same specialty: 11/30/2018 Lanny Wall MD  Last physical: Visit date not found Last MTM visit: Visit date not found   Next visit within 3 mo: Visit date not found  Next physical within 3 mo: Visit date not found  Prescriber OR PCP: Lanny Wall MD  Last diagnosis associated with med order: 1. GERD (gastroesophageal reflux disease)  - lansoprazole (PREVACID) 30 MG capsule [Pharmacy Med Name: LANSOPRAZOLE 30MG DR CAPSULES]; TAKE 1 CAPSULE(30 MG) BY MOUTH DAILY  Dispense: 30 capsule; Refill: 0    If protocol passes may refill for 12 months if within 3 months of last provider visit (or a total of 15 months).

## 2021-06-22 NOTE — ANESTHESIA PROCEDURE NOTES
Peripheral Block    Patient location during procedure: OR  Start time: 12/10/2018 2:42 PM  End time: 12/10/2018 2:48 PM  post-op analgesia per surgeon order as noted in medical record  Staffing:  Performing  Anesthesiologist: Declan Ryan MD  Preanesthetic Checklist  Completed: patient identified, site marked, risks, benefits, and alternatives discussed, timeout performed, consent obtained, airway assessed, oxygen available, suction available, emergency drugs available and hand hygiene performed  Peripheral Block  Block type: other, TAP  Prep: ChloraPrep  Patient monitoring: cardiac monitor, continuous pulse oximetry, heart rate and blood pressure  Laterality: bilateral, same technique used bilaterally  Injection technique: ultrasound guided    Ultrasound used to visualize needle placement in proximity to nerve being blocked: yes   Permanent ultrasound image captured for medical record      Needle  Needle type: Stimuplex   Needle gauge: 21 G  Needle length: 4 in  no peripheral nerve catheter placed  Assessment  Injection assessment: no difficulty with injection, negative aspiration for heme, no paresthesia on injection and incremental injection

## 2021-06-22 NOTE — PROGRESS NOTES
Preoperative Exam    Scheduled Procedure: Hysterectomy     Right carpal tunnel release and ganglion cyst  Surgery Date:  12/10/18      12/27/18  Surgery Location: [facility], fax [phone]    [place] fax [phone]  Surgeon:  Dr. GLORIA Cannon    Assessment/Plan:     1. Preop general physical exam  Patient is approved for surgery with general anesthesia for the hysterectomy.  She will have a block for her right wrist surgery.  Urine pregnancy test is negative and hemoglobin is normal.  - HM2(CBC w/o Differential)  - Pregnancy (Beta-hCG, Qual), Urine    2. Cyst of right ovary  Patient has a right 7 cm ovarian cyst.    3. Menorrhagia    4. Ganglion cyst of wrist, right    5. Carpal tunnel syndrome of right wrist    6. Gastroesophageal reflux disease without esophagitis  Patient is having breakthrough symptoms with the Prevacid.  Will add Carafate, half hour before meals and at bedtime.  She will try this for the next 4-6 weeks.  If no improvement in symptoms at that time, would consider GI consult with possible EGD.    7. Pain of right thigh  Patient is having some pain and numbness in her right anterior thigh.  She had a MRI of the region which did not show any cord compression or nerve compression.  I am wondering if maybe this could be related to the ovarian cyst on that side.  The symptoms are not severe at this time, so we will wait to see if it improves with surgery.  If not, would recommend returning to her primary care physician 4-6 weeks after surgery.    8. Rib pain  Patient has been having some intermittent rib pain in various locations, bilaterally.  It does not fit any pathologic diagnosis at this point.  It is not severe enough or associated with shortness of breath or other concerning symptoms.  I think this can wait till after her surgeries for further workup if does not resolve on its own.    Surgical Procedure Risk: Low (reported cardiac risk generally < 1%)  Have  you had prior anesthesia?: Yes  Have you or any family members had a previous anesthesia reaction:  No  Do you or any family members have a history of a clotting or bleeding disorder?: No  Cardiac Risk Assessment: no increased risk for major cardiac complications    Patient approved for surgery with general or local anesthesia.      Functional Status: Independent  Patient plans to recover at home with family.     Subjective:      Arcadio Barber is a 43 y.o. female who presents for a preoperative consultation.  Patient will be having 2 surgeries in the month of December.  The first of which will be a robotic assisted vaginal hysterectomy with possible oophorectomy.  When I saw her approximately 1 month ago, she was having some bilateral leg symptoms and I was concerned about lumbar spine pathology.  She had an MRI of her lumbar pain which incidentally showed a right ovarian cyst.  She then went back to her OB/GYN and had an ultrasound.  I do not have that for review, but per patient, she has a 7 cm ovarian cyst.  She also has had a history of endometriosis, menorrhagia, and iron with a hysterectomy with cyst removal.  Depending on how much scar tissue there is, they may have to take the ovary.  She has had multiple surgeries in the past without any issues with anesthesia.    Her second surgery will be removal of a right wrist ganglion cyst and carpal tunnel release.  This will be done with a block.  She has been having some issues for quite some time with this ganglion cyst.  Is causing some symptoms in her hand.  Patient had a similar surgery on the left wrist several years ago and it was very successful.    She has had no recent illnesses or exposures.  She has no known coronary artery disease.  She is a never smoker.  She is due for tetanus shot and this is updated today.  Her hemoglobin is normal today and her is negative.    She does have several other complaints today.  The first of which is ongoing reflux.   She is been taking the Prevacid 30 mg once daily.  She seems to have a lot of symptoms when she is eating.  She feels like she constantly has to clear her throat.    She is still having some symptoms in her right anterior leg.  She will feel some numbness and tingling.  She is not having any weakness and is not interfering with daily activities.  Her spine which does show a small ruptured disc, but nothing pushing on the nerve or the spinal cord.  It is possible to related to her ovarian cyst will wait to see if symptoms resolve after surgery.    She is also having some rib pain.  She states sometimes is on the right, other times is on the left.  It tends to come and go without any specific pattern.  It usually lasts for a couple seconds and then resolves.  Is not related to eating.  She does not have any associated shortness of breath.  There is really nothing she is tried that makes it go away.  Is not limiting her activities.    All other systems reviewed and are negative, other than those listed in the HPI.    Pertinent History  Do you have difficulty breathing or chest pain after walking up a flight of stairs: No  History of obstructive sleep apnea: No  Steroid use in the last 6 months: No  Frequent Aspirin/NSAID use: No  Prior Blood Transfusion: No  Prior Blood Transfusion Reaction: No  If for some reason prior to, during or after the procedure, if it is medically indicated, would you be willing to have a blood transfusion?:  There is no transfusion refusal.    Current Outpatient Medications   Medication Sig Dispense Refill     ascorbic acid, vitamin C, (VITAMIN C) 250 MG tablet 1 tab two times a day every other day with iron. 90 tablet 3     cholecalciferol, vitamin D3, (VITAMIN D3) 1,000 unit capsule Take 1,000 Units by mouth daily.       lansoprazole (PREVACID) 30 MG capsule Take 1 capsule (30 mg total) by mouth daily. 30 capsule 1     sucralfate (CARAFATE) 1 gram tablet Take 1 tablet (1 g total) by mouth 4  "(four) times a day. 120 tablet 1     No current facility-administered medications for this visit.         Allergies   Allergen Reactions     Codeine Nausea And Vomiting     Penicillins Rash     Sulfa (Sulfonamide Antibiotics) Rash       Patient Active Problem List   Diagnosis     Migraine Headache     Generalized Anxiety Disorder     Salivary Secretion Disturbance: Xerostomia     Numbness (Hypesthesia)     Iron Deficiency Anemia Secondary To Chronic Blood Loss     Anemia     Endometriosis     Menorrhagia     Iron Deficiency     Bunion     Fatigue     Pes Planus     Allergic Rhinitis       Past Medical History:   Diagnosis Date     Arrhythmia     PVC's on holter     Breast cyst        Past Surgical History:   Procedure Laterality Date     BREAST BIOPSY Right 2003     BREAST CYST EXCISION         Social History     Socioeconomic History     Marital status:      Spouse name: Not on file     Number of children: Not on file     Years of education: Not on file     Highest education level: Not on file   Social Needs     Financial resource strain: Not on file     Food insecurity - worry: Not on file     Food insecurity - inability: Not on file     Transportation needs - medical: Not on file     Transportation needs - non-medical: Not on file   Occupational History     Not on file   Tobacco Use     Smoking status: Never Smoker     Smokeless tobacco: Never Used   Substance and Sexual Activity     Alcohol use: Yes     Comment: occasional     Drug use: No     Sexual activity: Not on file   Other Topics Concern     Not on file   Social History Narrative     Not on file       Patient Care Team:  Hiro Alanis MD as PCP - General (Family Medicine)          Objective:     Vitals:    11/30/18 0908   BP: 110/62   Pulse: 80   Resp: 16   Temp: 98.6  F (37  C)   Weight: 162 lb (73.5 kg)   Height: 5' 7.5\" (1.715 m)   LMP: 11/08/2018         Physical Exam:  Physical Exam    Physical Exam:  General Appearance: Alert, " cooperative, no distress, appears stated age  Head: Normocephalic, without obvious abnormality, atraumatic  Eyes: PERRL, conjunctiva/corneas clear, EOM's intact  Ears: Normal TM's and external ear canals, both ears  Nose: Nares normal, septum midline,mucosa normal, no drainage  Throat: Lips, mucosa, and tongue normal; teeth and gums normal  Neck: Supple, symmetrical, trachea midline, no adenopathy; thyroid: not enlarged, symmetric, no tenderness/mass/nodules; no carotid bruit  Back: Symmetric, no curvature, ROM normal, no CVA tenderness  Lungs: Clear to auscultation bilaterally, respirations unlabored  Heart: Regular rate and rhythm, S1 and S2 normal, no murmur, rub, or gallop, Abdomen: Soft, non-tender, bowel sounds active all four quadrants,  no masses, no organomegaly  Extremities: Extremities normal, atraumatic, no cyanosis or edema, ganglion cyst right volar surface of the wrist  Skin: Skin color, texture, turgor normal, no rashes or lesions  Lymph nodes: Cervical, supraclavicular, and axillary nodes normal  Neurologic: Normal        Patient Instructions     Hold all supplements, aspirin and NSAIDs for 7 days prior to surgery.  Follow your surgeon's direction on when to stop eating and drinking prior to surgery.  Your surgeon will be managing your pain after your surgery.        Labs:  Recent Results (from the past 24 hour(s))   HM2(CBC w/o Differential)    Collection Time: 11/30/18  9:32 AM   Result Value Ref Range    WBC 6.4 4.0 - 11.0 thou/uL    RBC 4.73 3.80 - 5.40 mill/uL    Hemoglobin 12.9 12.0 - 16.0 g/dL    Hematocrit 38.1 35.0 - 47.0 %    MCV 81 80 - 100 fL    MCH 27.3 27.0 - 34.0 pg    MCHC 33.8 32.0 - 36.0 g/dL    RDW 13.4 11.0 - 14.5 %    Platelets 299 140 - 440 thou/uL    MPV 6.9 (L) 7.0 - 10.0 fL   Pregnancy (Beta-hCG, Qual), Urine    Collection Time: 11/30/18  9:32 AM   Result Value Ref Range    Pregnancy Test, Urine Negative Negative    Specific Gravity, UA 1.020 1.001 - 1.030       Immunization  History   Administered Date(s) Administered     Influenza, nasal, historic 09/28/2011     Influenza, seasonal,quad inj 36+ mos 10/27/2015, 09/19/2016, 10/23/2017     Influenza, seasonal,quad inj 6-35 mos 10/22/2012     Influenza,live, Nasal Laiv4 10/23/2013     Tdap 11/30/2018           Electronically signed by Lanny Wall MD 11/30/18 9:11 AM

## 2021-06-22 NOTE — ANESTHESIA PREPROCEDURE EVALUATION
Anesthesia Evaluation      Patient summary reviewed   History of anesthetic complications     Airway   Mallampati: II  Neck ROM: full   Pulmonary - negative ROS and normal exam                          Cardiovascular - normal exam  Exercise tolerance: > or = 4 METS  (+) dysrhythmias, ,      Neuro/Psych    (+) anxiety/panic attacks,     Endo/Other - negative ROS      GI/Hepatic/Renal    (+) GERD intermittent,        Other findings: Headaches. 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  H/o herniated lumbar disc, but MRI did not show any reason for her intermittent right anterior thigh numbness.  Resolving CTS symptoms as well as ganglion cyst.  Palpitations:  Holter and echo done mid-October of 2018 showed frequent PVCs, stress test nl except PVCs.  EKG 9/10/18 showed occasional PVCs.  She feels they have been decreasing in frequency.  Xerostomia.  Right ovarian cyst, endometriosis, menorrhagia, anemia.  Hg 12.9.  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  Has had PONV, codeine causes N & V.        Dental - normal exam                        Anesthesia Plan  Planned anesthetic: general endotracheal   Plan scop patch, background diprivan infusion. Pt consented for TAP blocks should they be feasible and surgeon would like them.  ASA 2   Induction: intravenous   Anesthetic plan and risks discussed with: patient and spouse  Anesthesia plan special considerations: antiemetics,   Post-op plan: routine recovery

## 2021-06-22 NOTE — ANESTHESIA CARE TRANSFER NOTE
Last vitals:   Vitals:    12/10/18 1503   BP: 116/58   Pulse: 87   Resp: 16   Temp: 37.2  C (98.9  F)   SpO2: 100%     Patient's level of consciousness is drowsy , but awake. Rates pain 2/10. Denies nausea.  Spontaneous respirations: yes  Maintains airway independently: yes  Dentition unchanged: yes  Oropharynx: oropharynx clear of all foreign objects    QCDR Measures:  ASA# 20 - Surgical Safety Checklist: WHO surgical safety checklist completed prior to induction    PQRS# 430 - Adult PONV Prevention: 4558F - Pt received => 2 anti-emetic agents (different classes) preop & intraop  ASA# 8 - Peds PONV Prevention: NA - Not pediatric patient, not GA or 2 or more risk factors NOT present  PQRS# 424 - Kelsi-op Temp Management: 4559F - At least one body temp DOCUMENTED => 35.5C or 95.9F within required timeframe  PQRS# 426 - PACU Transfer Protocol: - Transfer of care checklist used  ASA# 14 - Acute Post-op Pain: ASA14B - Patient did NOT experience pain >= 7 out of 10

## 2021-06-22 NOTE — ANESTHESIA POSTPROCEDURE EVALUATION
Patient: Arcadio Barber  ROBOTIC TOTAL LAPAROSCOPIC HYSTERECTOMY BILATERAL SALPINGECTOMY RIGHT OOPHORECTOMY CYSTOSCOPY  Anesthesia type: general    Patient location: PACU  Last vitals:   Vitals:    12/11/18 0030   BP: 119/65   Pulse: 87   Resp: 18   Temp: 36.7  C (98  F)   SpO2: 99%     Post vital signs: stable  Level of consciousness: awake and responds to simple questions  Post-anesthesia pain: pain controlled  Post-anesthesia nausea and vomiting: no  Pulmonary: unassisted, return to baseline  Cardiovascular: stable and blood pressure at baseline  Hydration: adequate  Anesthetic events: no    QCDR Measures:  ASA# 11 - Kelsi-op Cardiac Arrest: ASA11B - Patient did NOT experience unanticipated cardiac arrest  ASA# 12 - Kelsi-op Mortality Rate: ASA12B - Patient did NOT die  ASA# 13 - PACU Re-Intubation Rate: ASA13B - Patient did NOT require a new airway mgmt  ASA# 10 - Composite Anes Safety: ASA10A - No serious adverse event    Additional Notes:

## 2021-06-24 NOTE — PROGRESS NOTES
[facility] Clinic Note    Arcadio Barber  1975   [id number]    Arcadio Barber is a 43 y.o. female presenting to discuss the following:     CC:   Chief Complaint   Patient presents with     Cough       HPI:  Last summer, developed a cough. Was diagnosed with anemia. Went to urgent care as persisting, diagnosed with walking pneumonia. Was prescribed antibiotics, didn't get better/wasn't tolerating. Came back to see Dr. Conway, was diagnosed with walking pneumonia still, changed antibiotic, wasn't tolerating as well. Did formal PFT's, were normalized.   Developed palpitations at the same time, saw cardiology, that has improved.   Cough chalked up to reflux, trialed Prevacid, was improving for a while, but cough worsened again.   In December, ended up with hysterectomy.     After discontinuing Prevacid, started to feel worse again, did [facility] visit, diagnosed with sinusitis. Wasn't tolerating antibiotics well, but has cleared up.    Last week, was developing a chest cold.     Is worried about rib pain, constant, has daily, is worried in conjunction with cough. Pain is primarily bilateral anterior lower ribs and right lower back. Not pleuritic. Not short of breath out of ordinary. Sometimes worse if sick.     Cough is intermittent. Most of the time is mildly productive, thin and clear.     Is most concerned about rib pain. Thinks this has been present the majority of the time. Has worsened in frequency.     ROS:   CONSTITUTIONAL: No fevers or chills. No night sweats. No changes in weight, stable.   HEENT: Has a history of allergies, some postnasal drainage, usually not a winter issue. No sore throat.   HEART: No orthopnea, no swelling in legs.   LUNGS: See above. Occasional wheezing with chest cold. Has chest congestion, not really restriction/tightness. No hemoptysis.   ABDOMEN: Reflux is good currently. Was noticing throat clearing which has improved, voice is not hoarse, occasional sour taste in  mouth but much improved.  MSK: See above. Occasional pain in right hip. Also intermittent. Saw [facility] in January, had x-ray, didn't see anything, hasn't started physical therapy and holding off guided injection.   NEURO: Has numbness that radiates to foot, improving.     PMH:   Patient Active Problem List   Diagnosis     Migraine Headache     Generalized Anxiety Disorder     Salivary Secretion Disturbance: Xerostomia     Numbness (Hypesthesia)     Iron Deficiency Anemia Secondary To Chronic Blood Loss     Anemia     Endometriosis     Menorrhagia     Iron Deficiency     Bunion     Fatigue     Pes Planus     Allergic Rhinitis       Past Medical History:   Diagnosis Date     Arrhythmia     PVC's on holter     Breast cyst      GERD (gastroesophageal reflux disease)      PONV (postoperative nausea and vomiting)        PSH:   Past Surgical History:   Procedure Laterality Date     BREAST BIOPSY Right 2003     BREAST CYST EXCISION       DIAGNOSTIC LAPAROSCOPY       EXPLORATORY LAPAROTOMY      remove endometriosis     LAPAROSCOPIC TOTAL HYSTERECTOMY N/A 12/10/2018    Procedure: ROBOTIC TOTAL LAPAROSCOPIC HYSTERECTOMY BILATERAL SALPINGECTOMY RIGHT OOPHORECTOMY CYSTOSCOPY;  Surgeon: Davis Bhatti MD;  Location: [facility] [place];  Service: Gynecology         MEDICATIONS:   Current Outpatient Medications on File Prior to Visit   Medication Sig Dispense Refill     cholecalciferol, vitamin D3, (VITAMIN D3) 1,000 unit capsule Take 1,000 Units by mouth daily.       hydrOXYzine pamoate (VISTARIL) 25 MG capsule Take 1-2 capsules (25-50 mg total) by mouth every 4 (four) hours as needed (mild to moderate pain). 10 capsule 0     lansoprazole (PREVACID) 30 MG capsule TAKE 1 CAPSULE(30 MG) BY MOUTH DAILY 90 capsule 2     oxyCODONE (ROXICODONE) 5 MG immediate release tablet Take 1-2 tablets (5-10 mg total) by mouth every 4 (four) hours as needed. 13 tablet 0     sucralfate (CARAFATE) 1 gram tablet Take 1 tablet (1 g total) by mouth 4  "(four) times a day. 120 tablet 1     No current facility-administered medications on file prior to visit.    She is currently only taking vitamin D.     ALLERGIES:  Allergies   Allergen Reactions     Codeine Nausea And Vomiting     Penicillins Rash     Sulfa (Sulfonamide Antibiotics) Rash       PHYSICAL EXAM:   /82   Pulse 93   Temp 98  F (36.7  C) (Oral)   Resp 16   Ht 5' 8\" (1.727 m)   Wt 163 lb 6 oz (74.1 kg)   SpO2 100%   BMI 24.84 kg/m     GENERAL: Arcadio is a pleasant, well appearing female, in no acute distress. Appears stated age and healthy weight.   HEENT: Sclera white, no nasal discharge.  HEART: Regular rate and rhythm, no murmurs.   LUNGS: Clear to auscultation bilaterally, unlabored.   ABDOMEN: Soft, non-tender to palpation.   MSK: No focal tenderness with palpation of ribcage   LYMPH: No supraclavicular, cervical, or axillary lymphadenopathy.     LABS:   Recent Results (from the past 240 hour(s))   HM2(CBC w/o Differential)   Result Value Ref Range    WBC 6.2 4.0 - 11.0 thou/uL    RBC 4.81 3.80 - 5.40 mill/uL    Hemoglobin 12.9 12.0 - 16.0 g/dL    Hematocrit 38.6 35.0 - 47.0 %    MCV 80 80 - 100 fL    MCH 26.9 (L) 27.0 - 34.0 pg    MCHC 33.5 32.0 - 36.0 g/dL    RDW 13.0 11.0 - 14.5 %    Platelets 280 140 - 440 thou/uL    MPV 7.0 7.0 - 10.0 fL     ASSESSMENT & PLAN:   Arcadio Barber is a 43 y.o. female presenting today for evaluation of persistent cough and rib pain. 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     1. Cough  2. Rib pain on right side  3. Dermatofibrosarcoma  - CT Chest With Contrast; Future    We discussed 3 most common etiologies for chronic cough including postnasal drainage, asthma, and reflux. She has been treated empirically for both asthma and reflux. She has normal PFTs. She denies any postnasal " drainage. Given her history of dermatofibrosarcoma, Arcadio is worried about risk of malignancy, especially with comorbid rib pain. We discussed lack of constitutional symptoms as reassuring, but does not exclude malignancy.    Given patient concerns, discussed option of proceeding with CT scan to evaluate for pathology in lungs and ribs and better evaluate for lymphadenopathy. If CT is normal, this would be very reassuring for Arcadio and we will be able to proceed with other work-up for persistent cough. Discussed rib pain may also be due to MSK strain. If CT is normal, strongly encourage Arcadio to proceed with physical therapy for both hip and rib pain. I would recommend restarting treatment for GERD as well as postnasal drainage.    Consider referral to ENT and/or pulmonary for further evaluation of persistent cough. She did see pulmonology in October and thought she had a component of all 3 - hx asthma as child, hiatal hernia/GERD, and sinusitis/allergic rhinitis.     4. Iron deficiency anemia, unspecified iron deficiency anemia type  - HM2(CBC w/o Differential)      History of iron deficiency anemia, has not yet been rechecked to confirm normalized s/p hysterectomy. Labs have returned and anemia has resolved.     RTC: pending CT scan / no later than November 2019 for annual physical exam     I spent 25 minutes with the patient with 17 minutes counseling regarding my differential diagnosis, different empiric treatment options, risks vs benefits of proceed with CT imaging, and plan for follow up pending CT results.     Nilam Valiente, DO

## 2021-06-24 NOTE — TELEPHONE ENCOUNTER
Dr. Valiente-  See message below.  Pt last seen on 2/28/19.  Chest CT report from [facility] in your inbox for your review.

## 2021-06-24 NOTE — TELEPHONE ENCOUNTER
Test Results  Who is calling?:  Patient  Who ordered the test:  Dr. Valiente  Type of test: Imaging  Date of test:  2/28/19  Where was the test performed:  [facility]  What are your questions/concerns?:  Patient stated she would like to know her results of the CT Chest.  Okay to leave a detailed message?:  Yes  [phone]

## 2021-06-26 NOTE — PROGRESS NOTES
Progress Notes by Teresa Paredes MD at 10/22/2018  2:10 PM     Author: Teresa Paredes MD Service: -- Author Type: Physician    Filed: 10/22/2018  3:32 PM Encounter Date: 10/22/2018 Status: Signed    : Teresa Paredes MD (Physician)           Click to link to [facility] HEART CARE NOTE    Thank you, Dr. Orona, for asking the [facility] team to see Ms. Arcadio Barber to evaluate PVCs.    Assessment/Recommendations   Assessment:    1.  Frequent PVCs, etiology unclear.  Stress test and echocardiogram unrevealing as to an ischemic or structural cause.  Her Holter monitor demonstrated frequent PVCs making up 10% of all heartbeats with short runs of ventricular bigeminy and trigeminy but no runs of ventricular tachycardia.  Majority of the ectopy appears to be coming from the left ventricular outflow tract.  She denies any excess caffeine or alcohol use and no unusual stress.  No over-the-counter cold medications containing decongestants.  At this point, her symptoms appeared to have subsided on their own.  I recommended continued observation for now.  If her symptoms worsen, we could consider trial of low-dose beta-blocker such as metoprolol succinate 12.5 mg daily.    2.  Hiatal hernia with acid reflux    Plan:  1.  Continue observation for now  2.  Encourage the patient to call if any increase in symptoms       History of Present Illness    Ms. Arcadio Barber is a 43 y.o. female with unremarkable past medical history with the exception of acid reflux who presents today for evaluation of frequent ventricular ectopy.  Approximately 5 weeks ago, patient noted symptoms of palpitations.  These appeared to have started shortly after beginning omeprazole for treatment of acid reflux disease.  As a result, she was taken off omeprazole with initial resolution of symptoms; however, after a week or 2, her symptoms resurfaced.  She was seen at her primary clinic where an ECG  demonstrates sinus rhythm with frequent PVCs.  This led to a stress test which was negative for ischemia.  A subsequent echocardiogram demonstrated normal left ventricular and right ventricular function without wall motion abnormality.  No valve disease or other structural problems identified.  Subsequent Holter demonstrated frequent PVCs, making up 10% of her total beats.  Several runs of bigeminy and trigeminy were identified.  No ventricular tachycardia.  Symptoms of palpitations occurred both with PVCs as well as with sinus tachycardia.  She is now here for further recommendation.  Since the Holter was performed, the patient's symptoms of palpitations have essentially resolved.  She denies any increase in caffeine or alcohol intake over this period of time.  No unusual stress.  No use of over-the-counter cold medications containing decongestant.  Continues to have normal menstrual cycles although tells me her periods are heavier.  Recent hemoglobin was slightly low but not enough to explain the ectopy.    ECG (personally reviewed): ECG demonstrated normal sinus rhythm with occasional PVCs; otherwise, normal    Cardiac Imaging Studies (personally reviewed): Echo as reported above     Physical Examination Review of Systems   Vitals:    10/22/18 1426   BP: 122/86   Pulse: 92   Resp: 16     Body mass index is 25.77 kg/(m^2).  Wt Readings from Last 3 Encounters:   10/22/18 167 lb (75.8 kg)   10/11/18 165 lb (74.8 kg)   10/02/18 165 lb (74.8 kg)     General Appearance:   Awake, Alert, No acute distress.   HEENT:  No scleral icterus; the mucous membranes were pink and moist.   Neck: No cervical bruits or jugular venous distention    Chest: The spine was straight. The chest was symmetric.   Lungs:   Respirations unlabored; the lungs are clear to auscultation. No wheezing   Cardiovascular:    Regular rate and rhythm.  S1, S2 normal.  No murmur or gallop heard   Abdomen:  No organomegaly, masses, bruits, or tenderness.  Bowels sounds are present   Extremities:  Pulses are equal and symmetrical.  No peripheral edema, clubbing or cyanosis.   Skin: No xanthelasma. Warm, Dry.   Musculoskeletal: No tenderness.   Neurologic: Mood and affect are appropriate.    General: WNL  Eyes: WNL  Ears/Nose/Throat: WNL  Lungs: WNL  Heart: Irregular Heartbeat  Stomach: Heartburn  Bladder: WNL  Muscle/Joints: WNL  Skin: WNL  Nervous System: WNL  Mental Health: WNL     Blood: WNL     Medical History  Surgical History Family History Social History   Past Medical History:   Diagnosis Date   ? Arrhythmia     PVC's on holter   ? Breast cyst     Past Surgical History:   Procedure Laterality Date   ? BREAST BIOPSY Right 2003   ? BREAST CYST EXCISION      Family History   Problem Relation Age of Onset   ? Sudden death Mother    ? Cancer Father    ? Cancer Sister    ? No Medical Problems Daughter    ? No Medical Problems Maternal Grandmother    ? No Medical Problems Maternal Grandfather    ? No Medical Problems Paternal Grandmother    ? No Medical Problems Paternal Grandfather    ? No Medical Problems Maternal Aunt    ? No Medical Problems Paternal Aunt    ? BRCA 1/2 Neg Hx    ? Breast cancer Neg Hx    ? Colon cancer Neg Hx    ? Endometrial cancer Neg Hx    ? Ovarian cancer Neg Hx     Social History     Social History   ? Marital status:      Spouse name: N/A   ? Number of children: N/A   ? Years of education: N/A     Occupational History   ? Not on file.     Social History Main Topics   ? Smoking status: Never Smoker   ? Smokeless tobacco: Never Used   ? Alcohol use Yes      Comment: occasional   ? Drug use: No   ? Sexual activity: Not on file     Other Topics Concern   ? Not on file     Social History Narrative          Medications  Allergies   Current Outpatient Prescriptions   Medication Sig Dispense Refill   ? ascorbic acid, vitamin C, (VITAMIN C) 250 MG tablet 1 tab two times a day every other day with iron. 90 tablet 3   ? cholecalciferol, vitamin  D3, (VITAMIN D3) 1,000 unit capsule Take 1,000 Units by mouth daily.     ? ferrous sulfate 325 (65 FE) MG tablet 1 tab two times a day every other day with vitamin c. 60 tablet 3   ? ranitidine (ZANTAC) 150 MG tablet Take 150 mg by mouth daily.       No current facility-administered medications for this visit.       Allergies   Allergen Reactions   ? Codeine Nausea And Vomiting   ? Penicillins Rash   ? Sulfa (Sulfonamide Antibiotics) Rash         Lab Results    Chemistry/lipid CBC Cardiac Enzymes/BNP/TSH/INR   Lab Results   Component Value Date    CHOL 153 01/19/2012    HDL 56 01/19/2012    LDLCALC 86 01/19/2012    TRIG 57 01/19/2012    CREATININE 0.65 09/10/2018    BUN 9 09/10/2018    K 3.6 09/10/2018     09/10/2018     09/10/2018    CO2 24 09/10/2018    Lab Results   Component Value Date    WBC 7.0 09/10/2018    HGB 11.7 (L) 09/10/2018    HCT 34.9 (L) 09/10/2018    MCV 76 (L) 09/10/2018     09/10/2018    Lab Results   Component Value Date    TSH 1.61 09/10/2018

## 2021-07-03 NOTE — ADDENDUM NOTE
Addendum Note by Alicia Tejeda MD at 4/20/2017  6:24 PM     Author: Alicia Tejeda MD Service: -- Author Type: Physician    Filed: 4/20/2017  6:24 PM Encounter Date: 4/20/2017 Status: Signed    : Alicia Tejeda MD (Physician)    Addended by: ALICIA TEJEDA on: 4/20/2017 06:24 PM        Modules accepted: Orders

## 2021-07-03 NOTE — ADDENDUM NOTE
Addendum Note by Nasima Valiente DO at 6/18/2019  9:00 AM     Author: Nasima Valiente DO Service: -- Author Type: Physician    Filed: 6/18/2019  5:47 PM Encounter Date: 6/18/2019 Status: Signed    : Nasima Valiente DO (Physician)    Addended by: NASIMA VALIENTE on: 6/18/2019 05:47 PM        Modules accepted: Orders

## 2021-07-03 NOTE — ADDENDUM NOTE
Addendum Note by Naren Orona MD at 7/27/2018  9:56 AM     Author: Naren Orona MD Service: -- Author Type: Physician    Filed: 7/27/2018  9:56 AM Encounter Date: 7/26/2018 Status: Signed    : Naren Orona MD (Physician)    Addended by: NAREN ORONA on: 7/27/2018 09:56 AM        Modules accepted: Orders

## 2021-07-22 ENCOUNTER — RECORDS - HEALTHEAST (OUTPATIENT)
Dept: SCHEDULING | Facility: CLINIC | Age: 46
End: 2021-07-22

## 2021-07-22 DIAGNOSIS — Z12.31 OTHER SCREENING MAMMOGRAM: ICD-10-CM

## 2021-07-24 ENCOUNTER — HEALTH MAINTENANCE LETTER (OUTPATIENT)
Age: 46
End: 2021-07-24

## 2021-09-18 ENCOUNTER — HEALTH MAINTENANCE LETTER (OUTPATIENT)
Age: 46
End: 2021-09-18

## 2021-09-28 ENCOUNTER — E-VISIT (OUTPATIENT)
Dept: FAMILY MEDICINE | Facility: CLINIC | Age: 46
End: 2021-09-28
Payer: COMMERCIAL

## 2021-09-28 DIAGNOSIS — U07.1 2019 NOVEL CORONAVIRUS DISEASE (COVID-19): Primary | ICD-10-CM

## 2021-09-28 PROCEDURE — 99422 OL DIG E/M SVC 11-20 MIN: CPT | Performed by: FAMILY MEDICINE

## 2021-09-28 RX ORDER — BENZONATATE 200 MG/1
200 CAPSULE ORAL 3 TIMES DAILY PRN
Qty: 60 CAPSULE | Refills: 1 | Status: SHIPPED | OUTPATIENT
Start: 2021-09-28 | End: 2021-10-01

## 2021-09-28 NOTE — TELEPHONE ENCOUNTER
1. 2019 novel coronavirus disease (COVID-19)  - budesonide (PULMICORT FLEXHALER) 90 MCG/ACT inhaler; Inhale 2 puffs into the lungs 2 times daily  Dispense: 1 each; Refill: 0  - benzonatate (TESSALON) 200 MG capsule; Take 1 capsule (200 mg) by mouth 3 times daily as needed for cough  Dispense: 60 capsule; Refill: 1     Called Arcadio to further evaluate symptoms.  Has pulse ox at home.   On cefuroxime for sinusitis symptoms.    Will trial tessalon for cough management and ICS for dyspnea.    Provider E-Visit time total (minutes): 15 minutes

## 2021-10-01 ENCOUNTER — MYC MEDICAL ADVICE (OUTPATIENT)
Dept: FAMILY MEDICINE | Facility: CLINIC | Age: 46
End: 2021-10-01

## 2021-10-01 DIAGNOSIS — R06.02 SHORTNESS OF BREATH: Primary | ICD-10-CM

## 2021-10-01 DIAGNOSIS — U07.1 2019 NOVEL CORONAVIRUS DISEASE (COVID-19): ICD-10-CM

## 2021-10-01 DIAGNOSIS — R05.9 COUGH: ICD-10-CM

## 2021-10-01 RX ORDER — ALBUTEROL SULFATE 90 UG/1
2 AEROSOL, METERED RESPIRATORY (INHALATION) EVERY 6 HOURS
Qty: 6.7 G | Refills: 0 | Status: SHIPPED | OUTPATIENT
Start: 2021-10-01

## 2021-10-01 RX ORDER — BENZONATATE 200 MG/1
200 CAPSULE ORAL 3 TIMES DAILY PRN
Qty: 60 CAPSULE | Refills: 1 | Status: SHIPPED | OUTPATIENT
Start: 2021-10-01

## 2021-11-30 ENCOUNTER — TRANSFERRED RECORDS (OUTPATIENT)
Dept: HEALTH INFORMATION MANAGEMENT | Facility: CLINIC | Age: 46
End: 2021-11-30
Payer: COMMERCIAL

## 2022-03-02 ENCOUNTER — TRANSFERRED RECORDS (OUTPATIENT)
Dept: HEALTH INFORMATION MANAGEMENT | Facility: CLINIC | Age: 47
End: 2022-03-02
Payer: COMMERCIAL

## 2022-03-05 ENCOUNTER — HEALTH MAINTENANCE LETTER (OUTPATIENT)
Age: 47
End: 2022-03-05

## 2022-08-20 ENCOUNTER — HEALTH MAINTENANCE LETTER (OUTPATIENT)
Age: 47
End: 2022-08-20

## 2022-11-20 ENCOUNTER — HEALTH MAINTENANCE LETTER (OUTPATIENT)
Age: 47
End: 2022-11-20

## 2023-04-15 ENCOUNTER — HEALTH MAINTENANCE LETTER (OUTPATIENT)
Age: 48
End: 2023-04-15

## 2023-06-01 ENCOUNTER — TRANSFERRED RECORDS (OUTPATIENT)
Dept: HEALTH INFORMATION MANAGEMENT | Facility: CLINIC | Age: 48
End: 2023-06-01
Payer: COMMERCIAL

## 2023-09-16 ENCOUNTER — HEALTH MAINTENANCE LETTER (OUTPATIENT)
Age: 48
End: 2023-09-16

## 2024-04-16 ENCOUNTER — TRANSFERRED RECORDS (OUTPATIENT)
Dept: HEALTH INFORMATION MANAGEMENT | Facility: CLINIC | Age: 49
End: 2024-04-16
Payer: COMMERCIAL

## 2025-05-30 ENCOUNTER — TRANSFERRED RECORDS (OUTPATIENT)
Dept: HEALTH INFORMATION MANAGEMENT | Facility: CLINIC | Age: 50
End: 2025-05-30
Payer: COMMERCIAL